# Patient Record
Sex: FEMALE | Race: BLACK OR AFRICAN AMERICAN | NOT HISPANIC OR LATINO | Employment: UNEMPLOYED | URBAN - METROPOLITAN AREA
[De-identification: names, ages, dates, MRNs, and addresses within clinical notes are randomized per-mention and may not be internally consistent; named-entity substitution may affect disease eponyms.]

---

## 2021-05-05 ENCOUNTER — HISTORICAL (OUTPATIENT)
Dept: ADMINISTRATIVE | Facility: HOSPITAL | Age: 26
End: 2021-05-05

## 2021-05-05 LAB
ABS NEUT (OLG): 3.31 X10(3)/MCL (ref 2.1–9.2)
ALBUMIN SERPL-MCNC: 4.3 GM/DL (ref 3.5–5)
ALBUMIN/GLOB SERPL: 1.2 RATIO (ref 1.1–2)
ALP SERPL-CCNC: 57 UNIT/L (ref 40–150)
ALT SERPL-CCNC: 9 UNIT/L (ref 0–55)
APPEARANCE, UA: ABNORMAL
AST SERPL-CCNC: 14 UNIT/L (ref 5–34)
B-HCG SERPL QL: NEGATIVE
BACTERIA #/AREA URNS AUTO: ABNORMAL /HPF
BASOPHILS # BLD AUTO: 0 X10(3)/MCL (ref 0–0.2)
BASOPHILS NFR BLD AUTO: 1 %
BILIRUB SERPL-MCNC: 0.8 MG/DL
BILIRUB UR QL STRIP: NEGATIVE
BILIRUBIN DIRECT+TOT PNL SERPL-MCNC: 0.3 MG/DL (ref 0–0.5)
BILIRUBIN DIRECT+TOT PNL SERPL-MCNC: 0.5 MG/DL (ref 0–0.8)
BUN SERPL-MCNC: 10.8 MG/DL (ref 7–18.7)
CALCIUM SERPL-MCNC: 9.8 MG/DL (ref 8.4–10.2)
CHLORIDE SERPL-SCNC: 106 MMOL/L (ref 98–107)
CO2 SERPL-SCNC: 26 MMOL/L (ref 22–29)
COLOR UR: YELLOW
CREAT SERPL-MCNC: 0.73 MG/DL (ref 0.55–1.02)
EOSINOPHIL # BLD AUTO: 0.1 X10(3)/MCL (ref 0–0.9)
EOSINOPHIL NFR BLD AUTO: 2 %
ERYTHROCYTE [DISTWIDTH] IN BLOOD BY AUTOMATED COUNT: 15.5 % (ref 11.5–14.5)
EST. AVERAGE GLUCOSE BLD GHB EST-MCNC: 91.1 MG/DL
FERRITIN SERPL-MCNC: 26.38 NG/ML (ref 4.63–204)
FSH SERPL-ACNC: 5.77 MIU/ML
GLOBULIN SER-MCNC: 3.6 GM/DL (ref 2.4–3.5)
GLUCOSE (UA): NEGATIVE
GLUCOSE SERPL-MCNC: 88 MG/DL (ref 74–100)
HBA1C MFR BLD: 4.8 %
HCT VFR BLD AUTO: 34.4 % (ref 35–46)
HGB BLD-MCNC: 11.2 GM/DL (ref 12–16)
HGB UR QL STRIP: NEGATIVE
HYALINE CASTS #/AREA URNS LPF: ABNORMAL /LPF
IMM GRANULOCYTES # BLD AUTO: 0.01 10*3/UL
IMM GRANULOCYTES NFR BLD AUTO: 0 %
IRON SATN MFR SERPL: 20 % (ref 20–50)
IRON SERPL-MCNC: 62 UG/DL (ref 50–170)
KETONES UR QL STRIP: NEGATIVE
LEUKOCYTE ESTERASE UR QL STRIP: NEGATIVE
LH SERPL-ACNC: 3.31 MIU/ML
LYMPHOCYTES # BLD AUTO: 2.3 X10(3)/MCL (ref 0.6–4.6)
LYMPHOCYTES NFR BLD AUTO: 36 %
MCH RBC QN AUTO: 25.2 PG (ref 26–34)
MCHC RBC AUTO-ENTMCNC: 32.6 GM/DL (ref 31–37)
MCV RBC AUTO: 77.3 FL (ref 80–100)
MONOCYTES # BLD AUTO: 0.6 X10(3)/MCL (ref 0.1–1.3)
MONOCYTES NFR BLD AUTO: 10 %
NEUTROPHILS # BLD AUTO: 3.31 X10(3)/MCL (ref 2.1–9.2)
NEUTROPHILS NFR BLD AUTO: 52 %
NITRITE UR QL STRIP: NEGATIVE
PH UR STRIP: 8 [PH] (ref 4.5–8)
PLATELET # BLD AUTO: 410 X10(3)/MCL (ref 130–400)
PMV BLD AUTO: 9.2 FL (ref 7.4–10.4)
POTASSIUM SERPL-SCNC: 4 MMOL/L (ref 3.5–5.1)
PROLACTIN LEVEL (OHS): 12.8 NG/ML (ref 5.18–26.53)
PROT SERPL-MCNC: 7.9 GM/DL (ref 6.4–8.3)
PROT UR QL STRIP: 10 MG/DL
RBC # BLD AUTO: 4.45 X10(6)/MCL (ref 4–5.2)
RBC #/AREA URNS AUTO: ABNORMAL /HPF
RET# (OHS): 0.06 X10(6)/MCL (ref 0.02–0.08)
RETICULOCYTE COUNT AUTOMATED (OLG): 1.4 % (ref 0.5–1.5)
SODIUM SERPL-SCNC: 139 MMOL/L (ref 136–145)
SP GR UR STRIP: 1.02 (ref 1–1.03)
SQUAMOUS #/AREA URNS LPF: ABNORMAL /LPF
T4 FREE SERPL-MCNC: 0.87 NG/DL (ref 0.7–1.48)
TIBC SERPL-MCNC: 242 UG/DL (ref 70–310)
TIBC SERPL-MCNC: 304 UG/DL (ref 250–450)
TRANSFERRIN SERPL-MCNC: 262 MG/DL (ref 180–382)
TSH SERPL-ACNC: 0.34 UIU/ML (ref 0.35–4.94)
UROBILINOGEN UR STRIP-ACNC: NORMAL
WBC # SPEC AUTO: 6.4 X10(3)/MCL (ref 4.5–11)
WBC #/AREA URNS AUTO: ABNORMAL /HPF

## 2022-02-05 ENCOUNTER — HISTORICAL (OUTPATIENT)
Dept: ADMINISTRATIVE | Facility: HOSPITAL | Age: 27
End: 2022-02-05

## 2022-04-10 ENCOUNTER — HISTORICAL (OUTPATIENT)
Dept: ADMINISTRATIVE | Facility: HOSPITAL | Age: 27
End: 2022-04-10
Payer: MEDICAID

## 2022-04-29 VITALS
WEIGHT: 146.19 LBS | HEIGHT: 63 IN | SYSTOLIC BLOOD PRESSURE: 124 MMHG | DIASTOLIC BLOOD PRESSURE: 85 MMHG | BODY MASS INDEX: 25.9 KG/M2 | OXYGEN SATURATION: 100 %

## 2022-08-19 ENCOUNTER — HOSPITAL ENCOUNTER (EMERGENCY)
Facility: HOSPITAL | Age: 27
Discharge: HOME | End: 2022-08-19
Attending: STUDENT IN AN ORGANIZED HEALTH CARE EDUCATION/TRAINING PROGRAM
Payer: MEDICAID

## 2022-08-19 VITALS
SYSTOLIC BLOOD PRESSURE: 145 MMHG | TEMPERATURE: 98 F | WEIGHT: 159.81 LBS | HEART RATE: 71 BPM | BODY MASS INDEX: 27.28 KG/M2 | OXYGEN SATURATION: 100 % | HEIGHT: 64 IN | RESPIRATION RATE: 18 BRPM | DIASTOLIC BLOOD PRESSURE: 94 MMHG

## 2022-08-19 DIAGNOSIS — R10.9 ABDOMINAL CRAMPING: Primary | ICD-10-CM

## 2022-08-19 DIAGNOSIS — R19.7 DIARRHEA, UNSPECIFIED TYPE: ICD-10-CM

## 2022-08-19 LAB
ALBUMIN SERPL-MCNC: 4.3 GM/DL (ref 3.5–5)
ALBUMIN/GLOB SERPL: 1.1 RATIO (ref 1.1–2)
ALP SERPL-CCNC: 66 UNIT/L (ref 40–150)
ALT SERPL-CCNC: 7 UNIT/L (ref 0–55)
APPEARANCE UR: CLEAR
AST SERPL-CCNC: 13 UNIT/L (ref 5–34)
B-HCG UR QL: NEGATIVE
BACTERIA #/AREA URNS AUTO: ABNORMAL /HPF
BASOPHILS # BLD AUTO: 0.03 X10(3)/MCL (ref 0–0.2)
BASOPHILS NFR BLD AUTO: 0.6 %
BILIRUB UR QL STRIP.AUTO: NEGATIVE MG/DL
BILIRUBIN DIRECT+TOT PNL SERPL-MCNC: 0.4 MG/DL
BUN SERPL-MCNC: 9.4 MG/DL (ref 7–18.7)
CALCIUM SERPL-MCNC: 9.7 MG/DL (ref 8.4–10.2)
CHLORIDE SERPL-SCNC: 102 MMOL/L (ref 98–107)
CO2 SERPL-SCNC: 24 MMOL/L (ref 22–29)
COLOR UR AUTO: YELLOW
CREAT SERPL-MCNC: 0.77 MG/DL (ref 0.55–1.02)
CTP QC/QA: YES
EOSINOPHIL # BLD AUTO: 0.04 X10(3)/MCL (ref 0–0.9)
EOSINOPHIL NFR BLD AUTO: 0.8 %
ERYTHROCYTE [DISTWIDTH] IN BLOOD BY AUTOMATED COUNT: 14.9 % (ref 11.5–17)
GFR SERPLBLD CREATININE-BSD FMLA CKD-EPI: >60 MLS/MIN/1.73/M2
GLOBULIN SER-MCNC: 4 GM/DL (ref 2.4–3.5)
GLUCOSE SERPL-MCNC: 94 MG/DL (ref 74–100)
GLUCOSE UR QL STRIP.AUTO: NORMAL MG/DL
HCT VFR BLD AUTO: 35.4 % (ref 37–47)
HGB BLD-MCNC: 11.5 GM/DL (ref 12–16)
HYALINE CASTS #/AREA URNS LPF: ABNORMAL /LPF
IMM GRANULOCYTES # BLD AUTO: 0.01 X10(3)/MCL (ref 0–0.04)
IMM GRANULOCYTES NFR BLD AUTO: 0.2 %
KETONES UR QL STRIP.AUTO: NEGATIVE MG/DL
LEUKOCYTE ESTERASE UR QL STRIP.AUTO: NEGATIVE UNIT/L
LIPASE SERPL-CCNC: 19 U/L
LYMPHOCYTES # BLD AUTO: 1.71 X10(3)/MCL (ref 0.6–4.6)
LYMPHOCYTES NFR BLD AUTO: 34.8 %
MCH RBC QN AUTO: 24.4 PG (ref 27–31)
MCHC RBC AUTO-ENTMCNC: 32.5 MG/DL (ref 33–36)
MCV RBC AUTO: 75 FL (ref 80–94)
MONOCYTES # BLD AUTO: 0.43 X10(3)/MCL (ref 0.1–1.3)
MONOCYTES NFR BLD AUTO: 8.7 %
MUCOUS THREADS URNS QL MICRO: ABNORMAL /LPF
NEUTROPHILS # BLD AUTO: 2.7 X10(3)/MCL (ref 2.1–9.2)
NEUTROPHILS NFR BLD AUTO: 54.9 %
NITRITE UR QL STRIP.AUTO: NEGATIVE
NRBC BLD AUTO-RTO: 0 %
PH UR STRIP.AUTO: 6 [PH]
PLATELET # BLD AUTO: 417 X10(3)/MCL (ref 130–400)
PMV BLD AUTO: 9.4 FL (ref 7.4–10.4)
POTASSIUM SERPL-SCNC: 3.8 MMOL/L (ref 3.5–5.1)
PROT SERPL-MCNC: 8.3 GM/DL (ref 6.4–8.3)
PROT UR QL STRIP.AUTO: ABNORMAL MG/DL
RBC # BLD AUTO: 4.72 X10(6)/MCL (ref 4.2–5.4)
RBC #/AREA URNS AUTO: ABNORMAL /HPF
RBC UR QL AUTO: NEGATIVE UNIT/L
SODIUM SERPL-SCNC: 137 MMOL/L (ref 136–145)
SP GR UR STRIP.AUTO: 1.03
SQUAMOUS #/AREA URNS LPF: ABNORMAL /HPF
UROBILINOGEN UR STRIP-ACNC: NORMAL MG/DL
WBC # SPEC AUTO: 4.9 X10(3)/MCL (ref 4.5–11.5)
WBC #/AREA URNS AUTO: ABNORMAL /HPF

## 2022-08-19 PROCEDURE — 81025 URINE PREGNANCY TEST: CPT | Performed by: PHYSICIAN ASSISTANT

## 2022-08-19 PROCEDURE — 83690 ASSAY OF LIPASE: CPT | Performed by: PHYSICIAN ASSISTANT

## 2022-08-19 PROCEDURE — 85025 COMPLETE CBC W/AUTO DIFF WBC: CPT | Performed by: PHYSICIAN ASSISTANT

## 2022-08-19 PROCEDURE — 63600175 PHARM REV CODE 636 W HCPCS: Performed by: PHYSICIAN ASSISTANT

## 2022-08-19 PROCEDURE — 96372 THER/PROPH/DIAG INJ SC/IM: CPT | Performed by: PHYSICIAN ASSISTANT

## 2022-08-19 PROCEDURE — 36415 COLL VENOUS BLD VENIPUNCTURE: CPT | Performed by: PHYSICIAN ASSISTANT

## 2022-08-19 PROCEDURE — 81001 URINALYSIS AUTO W/SCOPE: CPT | Performed by: PHYSICIAN ASSISTANT

## 2022-08-19 PROCEDURE — 80053 COMPREHEN METABOLIC PANEL: CPT | Performed by: PHYSICIAN ASSISTANT

## 2022-08-19 PROCEDURE — 99284 EMERGENCY DEPT VISIT MOD MDM: CPT | Mod: 25

## 2022-08-19 RX ORDER — ONDANSETRON 4 MG/1
4 TABLET, FILM COATED ORAL EVERY 8 HOURS PRN
Qty: 12 TABLET | Refills: 0 | Status: SHIPPED | OUTPATIENT
Start: 2022-08-19 | End: 2022-08-19 | Stop reason: SDUPTHER

## 2022-08-19 RX ORDER — DICYCLOMINE HYDROCHLORIDE 10 MG/1
20 CAPSULE ORAL 4 TIMES DAILY PRN
Qty: 40 CAPSULE | Refills: 0 | Status: SHIPPED | OUTPATIENT
Start: 2022-08-19 | End: 2022-08-19 | Stop reason: SDUPTHER

## 2022-08-19 RX ORDER — DICYCLOMINE HYDROCHLORIDE 10 MG/ML
20 INJECTION INTRAMUSCULAR
Status: COMPLETED | OUTPATIENT
Start: 2022-08-19 | End: 2022-08-19

## 2022-08-19 RX ORDER — ONDANSETRON 4 MG/1
4 TABLET, FILM COATED ORAL EVERY 8 HOURS PRN
Qty: 12 TABLET | Refills: 0 | Status: SHIPPED | OUTPATIENT
Start: 2022-08-19 | End: 2023-02-13

## 2022-08-19 RX ORDER — DICYCLOMINE HYDROCHLORIDE 10 MG/1
20 CAPSULE ORAL 4 TIMES DAILY PRN
Qty: 40 CAPSULE | Refills: 0 | Status: SHIPPED | OUTPATIENT
Start: 2022-08-19 | End: 2022-08-24

## 2022-08-19 RX ADMIN — DICYCLOMINE HYDROCHLORIDE 20 MG: 20 INJECTION, SOLUTION INTRAMUSCULAR at 08:08

## 2022-08-19 NOTE — ED PROVIDER NOTES
Encounter Date: 8/19/2022       History     Chief Complaint   Patient presents with    Abdominal Pain     C/o diarrhea for a few days. Now no diarrhea but has abdominal pain and bright red blood in stool     Patient with no pmhx presents today c/o diarrhea and abdominal cramping/pain for the last 4-5 days. She says diarrhea has actually resolved. Now she is having frequent formed bowel movements with associated abdominal cramping. She also reports noticing small amount of bright red blood in stool 2 times. She has been taking peptobismol at home. Denies fever, chills, vomiting, constipation, dysuria, hematuria, vaginal bleeding, recent travel, sick contacts, recent abx use.         Review of patient's allergies indicates:  No Known Allergies  History reviewed. No pertinent past medical history.  History reviewed. No pertinent surgical history.  No family history on file.  Social History     Tobacco Use    Smoking status: Never Smoker    Smokeless tobacco: Never Used   Substance Use Topics    Alcohol use: Yes     Comment: occ    Drug use: Never     Review of Systems   Constitutional: Negative for chills and fever.   HENT: Positive for congestion. Negative for postnasal drip, rhinorrhea, sinus pressure, sinus pain and sore throat.    Respiratory: Negative for chest tightness and shortness of breath.    Cardiovascular: Negative for chest pain, palpitations and leg swelling.   Gastrointestinal: Positive for abdominal pain, blood in stool and diarrhea. Negative for abdominal distention, anal bleeding, constipation, nausea, rectal pain and vomiting.   Genitourinary: Negative for dysuria, flank pain and hematuria.   Musculoskeletal: Negative for arthralgias and myalgias.   Skin: Negative for rash.   Neurological: Negative for syncope, light-headedness and headaches.   Hematological: Does not bruise/bleed easily.       Physical Exam     Initial Vitals [08/19/22 0750]   BP Pulse Resp Temp SpO2   126/81 81 20 97.9 °F  (36.6 °C) 100 %      MAP       --         Physical Exam    Vitals reviewed.  Constitutional: She appears well-developed and well-nourished. She is not diaphoretic. No distress.   HENT:   Head: Normocephalic and atraumatic.   Mouth/Throat: Oropharynx is clear and moist.   Eyes: Conjunctivae and EOM are normal.   Neck: Neck supple.   Normal range of motion.  Cardiovascular: Normal rate, normal heart sounds and intact distal pulses.   Pulmonary/Chest: Breath sounds normal. No respiratory distress.   Abdominal: Abdomen is soft and flat. Bowel sounds are normal. There is generalized abdominal tenderness.   No right CVA tenderness.  No left CVA tenderness. There is no rebound and no guarding.   Musculoskeletal:         General: No edema.      Cervical back: Normal range of motion and neck supple.     Neurological: She is alert and oriented to person, place, and time. She has normal strength. GCS score is 15. GCS eye subscore is 4. GCS verbal subscore is 5. GCS motor subscore is 6.   Skin: Skin is warm and dry. Capillary refill takes less than 2 seconds.   Psychiatric: She has a normal mood and affect.         ED Course   Procedures  Labs Reviewed   COMPREHENSIVE METABOLIC PANEL - Abnormal; Notable for the following components:       Result Value    Globulin 4.0 (*)     All other components within normal limits   URINALYSIS, REFLEX TO URINE CULTURE - Abnormal; Notable for the following components:    Protein, UA Trace (*)     Squamous Epithelial Cells, UA Moderate (*)     Mucous, UA Many (*)     All other components within normal limits   CBC WITH DIFFERENTIAL - Abnormal; Notable for the following components:    Hgb 11.5 (*)     Hct 35.4 (*)     MCV 75.0 (*)     MCH 24.4 (*)     MCHC 32.5 (*)     Platelet 417 (*)     All other components within normal limits   LIPASE - Normal   CBC W/ AUTO DIFFERENTIAL    Narrative:     The following orders were created for panel order CBC auto differential.  Procedure                                Abnormality         Status                     ---------                               -----------         ------                     CBC with Differential[[id number]]        Abnormal            Final result                 Please view results for these tests on the individual orders.   EXTRA TUBES    Narrative:     The following orders were created for panel order EXTRA TUBES.  Procedure                               Abnormality         Status                     ---------                               -----------         ------                     Light Blue Top Hold[[id number]]                              In process                 Red Top Hold[[id number]]                                     In process                 Pink Top Hold[[id number]]                                    In process                   Please view results for these tests on the individual orders.   LIGHT BLUE TOP HOLD   RED TOP HOLD   PINK TOP HOLD   POCT URINE PREGNANCY          Imaging Results          X-Ray Abdomen Flat And Erect (Final result)  Result time 08/19/22 08:51:32    Final result by Remigio Maria MD (08/19/22 08:51:32)                 Impression:      No acute radiographic findings.      Electronically signed by: Remigio Maria  Date:    08/19/2022  Time:    08:51             Narrative:    EXAMINATION:  XR ABDOMEN FLAT AND ERECT    CLINICAL HISTORY:  Unspecified abdominal pain    COMPARISON:  5 February 2022    FINDINGS:  Flat and upright views of the abdomen.  There is a nonspecific, nonobstructive bowel gas pattern.  There is no large stool burden.  No free air is seen.                                 Medications   dicyclomine injection 20 mg (20 mg Intramuscular Given 8/19/22 0821)           APC / Resident Notes:   I was not physically present during the history, exam or disposition of this patient. I was available at all times for consultation. (Zmora)        ED Course as of 08/20/22 1175   Fri Aug 19, 2022   8751  Patient re-evaluated at this time. Resting comfortably in exam room. Her pain has improved. Labs and imaging reviewed. All results discussed with patient. She is stable for discharge. Strict return to ED precautions given. [SA]      ED Course User Index  [SA] STEVE Tesafye             Clinical Impression:   Final diagnoses:  [R10.9] Abdominal cramping (Primary)  [R19.7] Diarrhea, unspecified type          ED Disposition Condition    Discharge Stable        ED Prescriptions     Medication Sig Dispense Start Date End Date Auth. Provider    dicyclomine (BENTYL) 10 MG capsule  (Status: Discontinued) Take 2 capsules (20 mg total) by mouth 4 (four) times daily as needed (abdominal pain or cramping). 40 capsule 8/19/2022 8/19/2022 STEVE Tesfaye    ondansetron (ZOFRAN) 4 MG tablet  (Status: Discontinued) Take 1 tablet (4 mg total) by mouth every 8 (eight) hours as needed for Nausea. 12 tablet 8/19/2022 8/19/2022 STEVE Tesfaye    dicyclomine (BENTYL) 10 MG capsule Take 2 capsules (20 mg total) by mouth 4 (four) times daily as needed (abdominal pain or cramping). 40 capsule 8/19/2022 8/24/2022 STEVE Tesfaye    ondansetron (ZOFRAN) 4 MG tablet Take 1 tablet (4 mg total) by mouth every 8 (eight) hours as needed for Nausea. 12 tablet 8/19/2022  STEVE Tesfaye        Follow-up Information     Follow up With Specialties Details Why Contact Info    [facility] - Emergency Dept Emergency Medicine  If symptoms worsen return to ED immediately [address] [facility] [address] [phone]    Primary Care Provider within 1-2 days  Go in 1 day             STEVE Tesfaye  08/19/22 1699       Ender Haywood MD  08/20/22 2925

## 2022-08-19 NOTE — DISCHARGE INSTRUCTIONS
It is important that you follow up with your primary care provider or specialist if indicated for further evaluation, workup, and treatment as necessary. The exam and treatment you received in Emergency Department was for an urgent problem and NOT INTENDED AS COMPLETE CARE. It is important that you FOLLOW UP with a doctor for ongoing care. If your symptoms become WORSE or you DO NOT IMPROVE and you are unable to reach your health care provider, you should RETURN to the Emergency Department. The Emergency Department provider has provided a PRELIMINARY INTERPRETATION of all your studies. A final interpretation may be done after you are discharged. If a change in your diagnosis or treatment is needed WE WILL CONTACT YOU. It is critical that we have a CURRENT PHONE NUMBER FOR YOU.

## 2023-02-13 ENCOUNTER — OFFICE VISIT (OUTPATIENT)
Dept: FAMILY MEDICINE | Facility: CLINIC | Age: 28
End: 2023-02-13
Payer: MEDICAID

## 2023-02-13 VITALS
HEART RATE: 75 BPM | DIASTOLIC BLOOD PRESSURE: 84 MMHG | BODY MASS INDEX: 28.68 KG/M2 | RESPIRATION RATE: 20 BRPM | SYSTOLIC BLOOD PRESSURE: 138 MMHG | HEIGHT: 64 IN | TEMPERATURE: 99 F | WEIGHT: 168 LBS | OXYGEN SATURATION: 99 %

## 2023-02-13 DIAGNOSIS — F43.10 POSTTRAUMATIC STRESS DISORDER: ICD-10-CM

## 2023-02-13 DIAGNOSIS — F31.81 BIPOLAR II DISORDER, MOST RECENT EPISODE MAJOR DEPRESSIVE: ICD-10-CM

## 2023-02-13 DIAGNOSIS — F32.2 MAJOR DEPRESSIVE DISORDER, SINGLE EPISODE, SEVERE WITHOUT PSYCHOTIC FEATURES: ICD-10-CM

## 2023-02-13 DIAGNOSIS — Z00.00 ENCOUNTER FOR WELLNESS EXAMINATION: Primary | ICD-10-CM

## 2023-02-13 LAB
ALBUMIN SERPL-MCNC: 4.6 G/DL (ref 3.5–5)
ALBUMIN/GLOB SERPL: 1.2 RATIO (ref 1.1–2)
ALP SERPL-CCNC: 68 UNIT/L (ref 40–150)
ALT SERPL-CCNC: 11 UNIT/L (ref 0–55)
APPEARANCE UR: CLEAR
AST SERPL-CCNC: 17 UNIT/L (ref 5–34)
B-HCG UR QL: NEGATIVE
BACTERIA #/AREA URNS AUTO: ABNORMAL /HPF
BASOPHILS # BLD AUTO: 0.06 X10(3)/MCL (ref 0–0.2)
BASOPHILS NFR BLD AUTO: 1.2 %
BILIRUB UR QL STRIP.AUTO: NEGATIVE MG/DL
BILIRUBIN DIRECT+TOT PNL SERPL-MCNC: 1 MG/DL
BUN SERPL-MCNC: 10 MG/DL (ref 7–18.7)
CALCIUM SERPL-MCNC: 10 MG/DL (ref 8.4–10.2)
CHLORIDE SERPL-SCNC: 104 MMOL/L (ref 98–107)
CHOLEST SERPL-MCNC: 199 MG/DL
CHOLEST/HDLC SERPL: 3 {RATIO} (ref 0–5)
CO2 SERPL-SCNC: 25 MMOL/L (ref 22–29)
COLOR UR AUTO: ABNORMAL
CREAT SERPL-MCNC: 0.91 MG/DL (ref 0.55–1.02)
CTP QC/QA: YES
EOSINOPHIL # BLD AUTO: 0.09 X10(3)/MCL (ref 0–0.9)
EOSINOPHIL NFR BLD AUTO: 1.8 %
ERYTHROCYTE [DISTWIDTH] IN BLOOD BY AUTOMATED COUNT: 15.6 % (ref 11.5–17)
EST. AVERAGE GLUCOSE BLD GHB EST-MCNC: 93.9 MG/DL
GFR SERPLBLD CREATININE-BSD FMLA CKD-EPI: >60 MLS/MIN/1.73/M2
GLOBULIN SER-MCNC: 4 GM/DL (ref 2.4–3.5)
GLUCOSE SERPL-MCNC: 90 MG/DL (ref 74–100)
GLUCOSE UR QL STRIP.AUTO: NORMAL MG/DL
HAV IGM SERPL QL IA: NONREACTIVE
HBA1C MFR BLD: 4.9 %
HBV CORE IGM SERPL QL IA: NONREACTIVE
HBV SURFACE AG SERPL QL IA: NONREACTIVE
HCT VFR BLD AUTO: 35.9 % (ref 37–47)
HCV AB SERPL QL IA: NONREACTIVE
HDLC SERPL-MCNC: 67 MG/DL (ref 35–60)
HGB BLD-MCNC: 12.1 GM/DL (ref 12–16)
HIV 1+2 AB+HIV1 P24 AG SERPL QL IA: NONREACTIVE
HYALINE CASTS #/AREA URNS LPF: ABNORMAL /LPF
IMM GRANULOCYTES # BLD AUTO: 0.01 X10(3)/MCL (ref 0–0.04)
IMM GRANULOCYTES NFR BLD AUTO: 0.2 %
KETONES UR QL STRIP.AUTO: NEGATIVE MG/DL
LDLC SERPL CALC-MCNC: 123 MG/DL (ref 50–140)
LEUKOCYTE ESTERASE UR QL STRIP.AUTO: NEGATIVE UNIT/L
LYMPHOCYTES # BLD AUTO: 1.92 X10(3)/MCL (ref 0.6–4.6)
LYMPHOCYTES NFR BLD AUTO: 39.2 %
MCH RBC QN AUTO: 24.6 PG
MCHC RBC AUTO-ENTMCNC: 33.7 MG/DL (ref 33–36)
MCV RBC AUTO: 73.1 FL (ref 80–94)
MONOCYTES # BLD AUTO: 0.38 X10(3)/MCL (ref 0.1–1.3)
MONOCYTES NFR BLD AUTO: 7.8 %
MUCOUS THREADS URNS QL MICRO: ABNORMAL /LPF
NEUTROPHILS # BLD AUTO: 2.44 X10(3)/MCL (ref 2.1–9.2)
NEUTROPHILS NFR BLD AUTO: 49.8 %
NITRITE UR QL STRIP.AUTO: NEGATIVE
NRBC BLD AUTO-RTO: 0 %
PH UR STRIP.AUTO: 7.5 [PH]
PLATELET # BLD AUTO: 494 X10(3)/MCL (ref 130–400)
PMV BLD AUTO: 9.1 FL (ref 7.4–10.4)
POTASSIUM SERPL-SCNC: 4.5 MMOL/L (ref 3.5–5.1)
PROT SERPL-MCNC: 8.6 GM/DL (ref 6.4–8.3)
PROT UR QL STRIP.AUTO: NEGATIVE MG/DL
RBC # BLD AUTO: 4.91 X10(6)/MCL (ref 4.2–5.4)
RBC #/AREA URNS AUTO: ABNORMAL /HPF
RBC UR QL AUTO: NEGATIVE UNIT/L
SODIUM SERPL-SCNC: 139 MMOL/L (ref 136–145)
SP GR UR STRIP.AUTO: 1.02
SQUAMOUS #/AREA URNS LPF: ABNORMAL /HPF
T PALLIDUM AB SER QL: NONREACTIVE
T4 FREE SERPL-MCNC: 0.98 NG/DL (ref 0.7–1.48)
TRIGL SERPL-MCNC: 45 MG/DL (ref 37–140)
TSH SERPL-ACNC: 0.23 UIU/ML (ref 0.35–4.94)
UROBILINOGEN UR STRIP-ACNC: NORMAL MG/DL
VLDLC SERPL CALC-MCNC: 9 MG/DL
WBC # SPEC AUTO: 4.9 X10(3)/MCL (ref 4.5–11.5)
WBC #/AREA URNS AUTO: ABNORMAL /HPF

## 2023-02-13 PROCEDURE — 85025 COMPLETE CBC W/AUTO DIFF WBC: CPT | Performed by: NURSE PRACTITIONER

## 2023-02-13 PROCEDURE — 3075F SYST BP GE 130 - 139MM HG: CPT | Mod: CPTII,,, | Performed by: NURSE PRACTITIONER

## 2023-02-13 PROCEDURE — 3008F BODY MASS INDEX DOCD: CPT | Mod: CPTII,,, | Performed by: NURSE PRACTITIONER

## 2023-02-13 PROCEDURE — 3075F PR MOST RECENT SYSTOLIC BLOOD PRESS GE 130-139MM HG: ICD-10-PCS | Mod: CPTII,,, | Performed by: NURSE PRACTITIONER

## 2023-02-13 PROCEDURE — 99214 OFFICE O/P EST MOD 30 MIN: CPT | Mod: PBBFAC,PN | Performed by: NURSE PRACTITIONER

## 2023-02-13 PROCEDURE — 1159F PR MEDICATION LIST DOCUMENTED IN MEDICAL RECORD: ICD-10-PCS | Mod: CPTII,,, | Performed by: NURSE PRACTITIONER

## 2023-02-13 PROCEDURE — 84439 ASSAY OF FREE THYROXINE: CPT | Performed by: NURSE PRACTITIONER

## 2023-02-13 PROCEDURE — 99214 PR OFFICE/OUTPT VISIT, EST, LEVL IV, 30-39 MIN: ICD-10-PCS | Mod: S$PBB,,, | Performed by: NURSE PRACTITIONER

## 2023-02-13 PROCEDURE — 83036 HEMOGLOBIN GLYCOSYLATED A1C: CPT | Performed by: NURSE PRACTITIONER

## 2023-02-13 PROCEDURE — 99214 OFFICE O/P EST MOD 30 MIN: CPT | Mod: S$PBB,,, | Performed by: NURSE PRACTITIONER

## 2023-02-13 PROCEDURE — 80074 ACUTE HEPATITIS PANEL: CPT | Performed by: NURSE PRACTITIONER

## 2023-02-13 PROCEDURE — 80053 COMPREHEN METABOLIC PANEL: CPT | Performed by: NURSE PRACTITIONER

## 2023-02-13 PROCEDURE — 3079F PR MOST RECENT DIASTOLIC BLOOD PRESSURE 80-89 MM HG: ICD-10-PCS | Mod: CPTII,,, | Performed by: NURSE PRACTITIONER

## 2023-02-13 PROCEDURE — 87389 HIV-1 AG W/HIV-1&-2 AB AG IA: CPT | Performed by: NURSE PRACTITIONER

## 2023-02-13 PROCEDURE — 36415 COLL VENOUS BLD VENIPUNCTURE: CPT | Performed by: NURSE PRACTITIONER

## 2023-02-13 PROCEDURE — 3079F DIAST BP 80-89 MM HG: CPT | Mod: CPTII,,, | Performed by: NURSE PRACTITIONER

## 2023-02-13 PROCEDURE — 81025 URINE PREGNANCY TEST: CPT | Mod: PBBFAC,PN | Performed by: NURSE PRACTITIONER

## 2023-02-13 PROCEDURE — 81001 URINALYSIS AUTO W/SCOPE: CPT | Performed by: NURSE PRACTITIONER

## 2023-02-13 PROCEDURE — 84443 ASSAY THYROID STIM HORMONE: CPT | Performed by: NURSE PRACTITIONER

## 2023-02-13 PROCEDURE — 1159F MED LIST DOCD IN RCRD: CPT | Mod: CPTII,,, | Performed by: NURSE PRACTITIONER

## 2023-02-13 PROCEDURE — 1160F RVW MEDS BY RX/DR IN RCRD: CPT | Mod: CPTII,,, | Performed by: NURSE PRACTITIONER

## 2023-02-13 PROCEDURE — 80061 LIPID PANEL: CPT | Performed by: NURSE PRACTITIONER

## 2023-02-13 PROCEDURE — 86780 TREPONEMA PALLIDUM: CPT | Performed by: NURSE PRACTITIONER

## 2023-02-13 PROCEDURE — 1160F PR REVIEW ALL MEDS BY PRESCRIBER/CLIN PHARMACIST DOCUMENTED: ICD-10-PCS | Mod: CPTII,,, | Performed by: NURSE PRACTITIONER

## 2023-02-13 PROCEDURE — 3008F PR BODY MASS INDEX (BMI) DOCUMENTED: ICD-10-PCS | Mod: CPTII,,, | Performed by: NURSE PRACTITIONER

## 2023-02-13 NOTE — ASSESSMENT & PLAN NOTE
Referral to Dr. Loyola, resource list handed to patient to call [facility] for quicker appointment if necessary.   ER for SI/HI

## 2023-02-13 NOTE — ASSESSMENT & PLAN NOTE
Referral to Dr. Loyola, resource list handed to patient to call [facility] for quicker appointment if necessary.   ER for SI/HI  Read positive daily meditations, avoid negative media, set healthy boundaries.  Exercise daily, keep consistent sleep pattern, eat a healthy diet.  Establish good social support, make changes to reduce stress.  Reports any symptoms of suicidal/homicidal ideations or self harm immediately, if clinic is closed go to nearest emergency room.

## 2023-02-13 NOTE — ASSESSMENT & PLAN NOTE
Referral to Dr. Loyola, resource list handed to patient to call [facility] for quicker appointment if necessary.   ER for SI/HI

## 2023-02-13 NOTE — PROGRESS NOTES
"Patient Name: Himanshu Mallory   : 1995  MRN: [id number]     SUBJECTIVE DATA:    CHIEF COMPLAINT:   Himanshu Mallory is a 27 y.o. female who presents to clinic today with Follow-up (Depression, fasting)        HPI:  23:  Reestablish care. Has not been seen since 2022. Having depressive symptoms, hx bipolar II disorder, previously seen by MARIA G Thomson.  Is not on any medication for her depression. Denies SI/HI.  She does not have counselor. Hx PTSD.  Is having abnormal uterine bleeding, previously established with women's health clinic, number given to call for appointment.  Denies any other complaints today.  Feels tired most days.          ALLERGIES: Review of patient's allergies indicates:  No Known Allergies      ROS:  Review of Systems   Constitutional:  Positive for malaise/fatigue. Negative for chills, diaphoresis, fever and weight loss.   Eyes: Negative.    Respiratory: Negative.     Cardiovascular: Negative.    Gastrointestinal: Negative.    Genitourinary: Negative.    Musculoskeletal: Negative.    Skin:  Negative for itching and rash.   Neurological: Negative.    Endo/Heme/Allergies: Negative.    Psychiatric/Behavioral:  Positive for depression. Negative for memory loss, substance abuse and suicidal ideas. The patient is not nervous/anxious and does not have insomnia.        OBJECTIVE DATA:  Vital signs  Vitals:    23 1039   BP: 138/84   BP Location: Right arm   Patient Position: Sitting   BP Method: Medium (Automatic)   Pulse: 75   Resp: 20   Temp: 98.6 °F (37 °C)   TempSrc: Oral   SpO2: 99%   Weight: 76.2 kg (168 lb)   Height: 5' 4" (1.626 m)      Body mass index is 28.84 kg/m².    PHYSICAL EXAM:   Physical Exam  Vitals and nursing note reviewed.   HENT:      Head: Normocephalic and atraumatic.   Cardiovascular:      Rate and Rhythm: Normal rate and regular rhythm.      Pulses: Normal pulses.      Heart sounds: Normal heart sounds.   Pulmonary:      Breath sounds: Normal " breath sounds.   Musculoskeletal:         General: Normal range of motion.      Cervical back: Normal range of motion.   Skin:     General: Skin is warm and dry.   Neurological:      General: No focal deficit present.      Mental Status: She is alert and oriented to person, place, and time.   Psychiatric:         Mood and Affect: Mood normal.        ASSESSMENT/PLAN:  1. Encounter for wellness examination  -     Lipid Panel  -     CBC Auto Differential  -     Comprehensive Metabolic Panel  -     Urinalysis  -     Hemoglobin A1C  -     TSH  -     T4, Free  -     Hepatitis Panel, Acute  -     HIV 1/2 Ag/Ab (4th Gen)  -     SYPHILIS ANTIBODY (WITH REFLEX RPR)  -     POCT Urine Pregnancy    2. Bipolar II disorder, most recent episode major depressive  Assessment & Plan:  Referral to Dr. Loyola, resource list handed to patient to call [facility] for quicker appointment if necessary.   ER for SI/HI  Read positive daily meditations, avoid negative media, set healthy boundaries.  Exercise daily, keep consistent sleep pattern, eat a healthy diet.  Establish good social support, make changes to reduce stress.  Reports any symptoms of suicidal/homicidal ideations or self harm immediately, if clinic is closed go to nearest emergency room.      Orders:  -     Ambulatory referral/consult to Behavioral Health; Future; Expected date: 02/13/2023    3. Posttraumatic stress disorder  Assessment & Plan:  Referral to Dr. Loyola, resource list handed to patient to call [facility] for quicker appointment if necessary.   ER for SI/HI      4. Major depressive disorder, single episode, severe without psychotic features  Assessment & Plan:  Referral to Dr. Loyola, resource list handed to patient to call [facility] for quicker appointment if necessary.   ER for SI/HI             RESULTS:  Recent Results (from the past 1008 hour(s))   POCT Urine Pregnancy    Collection Time: 02/13/23 11:27 AM   Result Value Ref Range    POC Preg Test, Ur Negative Negative    Quality  Control Acceptable Yes          Follow Up:  Follow up in about 2 weeks (around 2/27/2023) for Virtual Visit, Review of labs.               This note was created with the assistance of a voice recognition software or phone dictation. There may be transcription errors as a result of using this technology however minimal. Effort has been made to assure accuracy of transcription but any obvious errors or omissions should be clarified with the author of the document

## 2023-02-14 ENCOUNTER — TELEPHONE (OUTPATIENT)
Dept: GYNECOLOGY | Facility: CLINIC | Age: 28
End: 2023-02-14

## 2023-02-14 ENCOUNTER — CLINICAL SUPPORT (OUTPATIENT)
Dept: GYNECOLOGY | Facility: CLINIC | Age: 28
End: 2023-02-14
Payer: MEDICAID

## 2023-02-14 ENCOUNTER — TELEPHONE (OUTPATIENT)
Dept: FAMILY MEDICINE | Facility: CLINIC | Age: 28
End: 2023-02-14
Payer: MEDICAID

## 2023-02-14 ENCOUNTER — OFFICE VISIT (OUTPATIENT)
Dept: GYNECOLOGY | Facility: CLINIC | Age: 28
End: 2023-02-14
Payer: MEDICAID

## 2023-02-14 VITALS
TEMPERATURE: 98 F | HEART RATE: 82 BPM | OXYGEN SATURATION: 98 % | SYSTOLIC BLOOD PRESSURE: 122 MMHG | WEIGHT: 174.38 LBS | DIASTOLIC BLOOD PRESSURE: 87 MMHG | HEIGHT: 64 IN | RESPIRATION RATE: 18 BRPM | BODY MASS INDEX: 29.77 KG/M2

## 2023-02-14 DIAGNOSIS — Z01.419 WOMEN'S ANNUAL ROUTINE GYNECOLOGICAL EXAMINATION: Primary | ICD-10-CM

## 2023-02-14 DIAGNOSIS — E05.90 HYPERTHYROIDISM: Primary | ICD-10-CM

## 2023-02-14 DIAGNOSIS — Z23 NEED FOR HPV VACCINE: Primary | ICD-10-CM

## 2023-02-14 LAB
B-HCG UR QL: NEGATIVE
CLUE CELLS VAG QL WET PREP: ABNORMAL
CTP QC/QA: YES
T VAGINALIS VAG QL WET PREP: ABNORMAL
WBC #/AREA VAG WET PREP: ABNORMAL
YEAST SPEC QL WET PREP: ABNORMAL

## 2023-02-14 PROCEDURE — 99214 OFFICE O/P EST MOD 30 MIN: CPT | Mod: PBBFAC | Performed by: NURSE PRACTITIONER

## 2023-02-14 PROCEDURE — 3079F PR MOST RECENT DIASTOLIC BLOOD PRESSURE 80-89 MM HG: ICD-10-PCS | Mod: CPTII,,, | Performed by: NURSE PRACTITIONER

## 2023-02-14 PROCEDURE — 3079F DIAST BP 80-89 MM HG: CPT | Mod: CPTII,,, | Performed by: NURSE PRACTITIONER

## 2023-02-14 PROCEDURE — 99395 PREV VISIT EST AGE 18-39: CPT | Mod: S$PBB,,, | Performed by: NURSE PRACTITIONER

## 2023-02-14 PROCEDURE — 87210 SMEAR WET MOUNT SALINE/INK: CPT | Performed by: NURSE PRACTITIONER

## 2023-02-14 PROCEDURE — 3074F PR MOST RECENT SYSTOLIC BLOOD PRESSURE < 130 MM HG: ICD-10-PCS | Mod: CPTII,,, | Performed by: NURSE PRACTITIONER

## 2023-02-14 PROCEDURE — 99395 PR PREVENTIVE VISIT,EST,18-39: ICD-10-PCS | Mod: S$PBB,,, | Performed by: NURSE PRACTITIONER

## 2023-02-14 PROCEDURE — 81025 URINE PREGNANCY TEST: CPT | Mod: PBBFAC | Performed by: NURSE PRACTITIONER

## 2023-02-14 PROCEDURE — 88174 CYTOPATH C/V AUTO IN FLUID: CPT

## 2023-02-14 PROCEDURE — 1159F MED LIST DOCD IN RCRD: CPT | Mod: CPTII,,, | Performed by: NURSE PRACTITIONER

## 2023-02-14 PROCEDURE — 1159F PR MEDICATION LIST DOCUMENTED IN MEDICAL RECORD: ICD-10-PCS | Mod: CPTII,,, | Performed by: NURSE PRACTITIONER

## 2023-02-14 PROCEDURE — 87491 CHLMYD TRACH DNA AMP PROBE: CPT

## 2023-02-14 PROCEDURE — 1160F RVW MEDS BY RX/DR IN RCRD: CPT | Mod: CPTII,,, | Performed by: NURSE PRACTITIONER

## 2023-02-14 PROCEDURE — 90471 IMMUNIZATION ADMIN: CPT | Mod: PBBFAC

## 2023-02-14 PROCEDURE — 3008F PR BODY MASS INDEX (BMI) DOCUMENTED: ICD-10-PCS | Mod: CPTII,,, | Performed by: NURSE PRACTITIONER

## 2023-02-14 PROCEDURE — 3074F SYST BP LT 130 MM HG: CPT | Mod: CPTII,,, | Performed by: NURSE PRACTITIONER

## 2023-02-14 PROCEDURE — 87591 N.GONORRHOEAE DNA AMP PROB: CPT

## 2023-02-14 PROCEDURE — 1160F PR REVIEW ALL MEDS BY PRESCRIBER/CLIN PHARMACIST DOCUMENTED: ICD-10-PCS | Mod: CPTII,,, | Performed by: NURSE PRACTITIONER

## 2023-02-14 PROCEDURE — 3008F BODY MASS INDEX DOCD: CPT | Mod: CPTII,,, | Performed by: NURSE PRACTITIONER

## 2023-02-14 PROCEDURE — 90651 9VHPV VACCINE 2/3 DOSE IM: CPT | Mod: PBBFAC

## 2023-02-14 RX ORDER — METRONIDAZOLE 500 MG/1
500 TABLET ORAL EVERY 12 HOURS
Qty: 14 TABLET | Refills: 0 | Status: SHIPPED | OUTPATIENT
Start: 2023-02-14 | End: 2023-02-21

## 2023-02-14 RX ADMIN — HUMAN PAPILLOMAVIRUS 9-VALENT VACCINE, RECOMBINANT 0.5 ML: 30; 40; 60; 40; 20; 20; 20; 20; 20 INJECTION, SUSPENSION INTRAMUSCULAR at 02:02

## 2023-02-14 NOTE — TELEPHONE ENCOUNTER
----- Message from DEVIN Jose sent at 2/14/2023  8:14 AM CST -----  Thyroid level is low.  It was also low 1 year ago. This indicates hyperthyroidism. It was lower this time than 1 year ago.  I would like to repeat in 1-2 weeks and if still low, we will have to start treatment for hyperthyroidism.  This can affect mental state as well.      Hyperthyroidism occurs when the thyroid gland makes too much thyroid hormone. This speeds up your metabolism-how your body uses energy. This condition can cause you to be very active, lose weight, and have sleep problems, eye problems, and a fast heart rate. It can also cause a goiter.    If you have hyperthyroidism, lifestyle changes may help reduce your symptoms. Avoiding caffeine and reducing stress may help relieve symptoms of anxiety, nervousness, poor concentration, and fast heartbeat. Quitting smoking can reduce your risk of developing Grave's Ophthalmopathy if you have Grave's Disease    You should not be treated with Lithium for any conditions related to mental health disorders due to its effects on the thyroid gland.     If chest pain occurs, please go to ER immediately.      Please contact me if you have any additional concerns.    Sincerely,    Miriam Lazo

## 2023-02-14 NOTE — PROGRESS NOTES
I have sent medications and/or lab orders in for this patient.  Please notify the patient.     Orders Placed This Encounter   Procedures    TSH     Standing Status:   Future     Standing Expiration Date:   3/7/2023
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
## 2023-02-15 LAB — PATH REV: NORMAL

## 2023-02-20 LAB — PSYCHE PATHOLOGY RESULT: NORMAL

## 2023-03-06 ENCOUNTER — OFFICE VISIT (OUTPATIENT)
Dept: FAMILY MEDICINE | Facility: CLINIC | Age: 28
End: 2023-03-06
Payer: MEDICAID

## 2023-03-06 DIAGNOSIS — R79.89 LOW TSH LEVEL: ICD-10-CM

## 2023-03-06 PROCEDURE — 1159F MED LIST DOCD IN RCRD: CPT | Mod: CPTII,95,, | Performed by: NURSE PRACTITIONER

## 2023-03-06 PROCEDURE — 1159F PR MEDICATION LIST DOCUMENTED IN MEDICAL RECORD: ICD-10-PCS | Mod: CPTII,95,, | Performed by: NURSE PRACTITIONER

## 2023-03-06 PROCEDURE — 1160F RVW MEDS BY RX/DR IN RCRD: CPT | Mod: CPTII,95,, | Performed by: NURSE PRACTITIONER

## 2023-03-06 PROCEDURE — 1160F PR REVIEW ALL MEDS BY PRESCRIBER/CLIN PHARMACIST DOCUMENTED: ICD-10-PCS | Mod: CPTII,95,, | Performed by: NURSE PRACTITIONER

## 2023-03-06 PROCEDURE — 99213 PR OFFICE/OUTPT VISIT, EST, LEVL III, 20-29 MIN: ICD-10-PCS | Mod: 95,,, | Performed by: NURSE PRACTITIONER

## 2023-03-06 PROCEDURE — 99213 OFFICE O/P EST LOW 20 MIN: CPT | Mod: 95,,, | Performed by: NURSE PRACTITIONER

## 2023-03-06 NOTE — PROGRESS NOTES
Patient Name: Himanshu Mallory   : 1995  MRN: [id number]     SUBJECTIVE DATA:    CHIEF COMPLAINT:   Himanshu Mallory is a 27 y.o. female who presents to clinic today with Follow-up (2 week f/u for review of labs)        HPI:  3/6/23:  2 week FU review of labs.  TSH abnormal.  Did not get repeat done yet.  Will come to clinic to have drawn.  Tried to get in touch with [facility] but unable to get in.  She was referred to Nir at her last appointment.      23:  Reestablish care. Has not been seen since 2022. Having depressive symptoms, hx bipolar II disorder, previously seen by MARIA G Thomson.  Is not on any medication for her depression. Denies SI/HI.  She does not have counselor. Hx PTSD.  Is having abnormal uterine bleeding, previously established with women's health clinic, number given to call for appointment.  Denies any other complaints today.  Feels tired most days        ALLERGIES: Review of patient's allergies indicates:  No Known Allergies      ROS:  Review of Systems   Constitutional:  Positive for malaise/fatigue.   Psychiatric/Behavioral:  Positive for depression. The patient is nervous/anxious.        OBJECTIVE DATA:  Vital signs  There were no vitals filed for this visit.   There is no height or weight on file to calculate BMI.    PHYSICAL EXAM:   Physical Exam  Vitals and nursing note reviewed.   Neurological:      Mental Status: She is alert.   Psychiatric:         Attention and Perception: Attention normal.         Mood and Affect: Mood normal.         Speech: Speech normal.         Behavior: Behavior normal. Behavior is cooperative.         Thought Content: Thought content normal.         Cognition and Memory: Cognition normal.         Judgment: Judgment normal.        ASSESSMENT/PLAN:  1. Low TSH level  Assessment & Plan:  Come to clinic to repeat TSH level as ordered.    Will notify her if tx needed.              RESULTS:  Recent Results (from the past 1008 hour(s))   Lipid Panel     Collection Time: 02/13/23 11:27 AM   Result Value Ref Range    Cholesterol Total 199 <=200 mg/dL    HDL Cholesterol 67 (H) 35 - 60 mg/dL    Triglyceride 45 37 - 140 mg/dL    Cholesterol/HDL Ratio 3 0 - 5    Very Low Density Lipoprotein 9     LDL Cholesterol 123.00 50.00 - 140.00 mg/dL   Comprehensive Metabolic Panel    Collection Time: 02/13/23 11:27 AM   Result Value Ref Range    Sodium Level 139 136 - 145 mmol/L    Potassium Level 4.5 3.5 - 5.1 mmol/L    Chloride 104 98 - 107 mmol/L    Carbon Dioxide 25 22 - 29 mmol/L    Glucose Level 90 74 - 100 mg/dL    Blood Urea Nitrogen 10.0 7.0 - 18.7 mg/dL    Creatinine 0.91 0.55 - 1.02 mg/dL    Calcium Level Total 10.0 8.4 - 10.2 mg/dL    Protein Total 8.6 (H) 6.4 - 8.3 gm/dL    Albumin Level 4.6 3.5 - 5.0 g/dL    Globulin 4.0 (H) 2.4 - 3.5 gm/dL    Albumin/Globulin Ratio 1.2 1.1 - 2.0 ratio    Bilirubin Total 1.0 <=1.5 mg/dL    Alkaline Phosphatase 68 40 - 150 unit/L    Alanine Aminotransferase 11 0 - 55 unit/L    Aspartate Aminotransferase 17 5 - 34 unit/L    eGFR >60 mls/min/1.73/m2   Urinalysis    Collection Time: 02/13/23 11:27 AM   Result Value Ref Range    Color, UA Light-Yellow Yellow, Light-Yellow, Dark Yellow, Miryam, Straw    Appearance, UA Clear Clear    Specific Gravity, UA 1.021     pH, UA 7.5 5.0 - 8.5    Protein, UA Negative Negative mg/dL    Glucose, UA Normal Negative, Normal mg/dL    Ketones, UA Negative Negative mg/dL    Blood, UA Negative Negative unit/L    Bilirubin, UA Negative Negative mg/dL    Urobilinogen, UA Normal 0.2, 1.0, Normal mg/dL    Nitrites, UA Negative Negative    Leukocyte Esterase, UA Negative Negative unit/L    WBC, UA 0-5 None Seen, 0-2, 3-5, 0-5 /HPF    Bacteria, UA None Seen None Seen /HPF    Squamous Epithelial Cells, UA Trace (A) None Seen /HPF    Mucous, UA Trace (A) None Seen /LPF    Hyaline Casts, UA None Seen None Seen /lpf    RBC, UA 0-5 None Seen, 0-2, 3-5, 0-5 /HPF   Hemoglobin A1C    Collection Time: 02/13/23 11:27 AM    Result Value Ref Range    Hemoglobin A1c 4.9 <=7.0 %    Estimated Average Glucose 93.9 mg/dL   TSH    Collection Time: 02/13/23 11:27 AM   Result Value Ref Range    Thyroid Stimulating Hormone 0.227 (L) 0.350 - 4.940 uIU/mL   T4, Free    Collection Time: 02/13/23 11:27 AM   Result Value Ref Range    Thyroxine Free 0.98 0.70 - 1.48 ng/dL   Hepatitis Panel, Acute    Collection Time: 02/13/23 11:27 AM   Result Value Ref Range    Hepatitis A IgM Nonreactive Nonreactive    Hepatitis B Core IgM Nonreactive Nonreactive    Hepatitis B Surface Antigen Nonreactive Nonreactive    Hepatitis C Antibody Nonreactive Nonreactive   HIV 1/2 Ag/Ab (4th Gen)    Collection Time: 02/13/23 11:27 AM   Result Value Ref Range    HIV Nonreactive Nonreactive   SYPHILIS ANTIBODY (WITH REFLEX RPR)    Collection Time: 02/13/23 11:27 AM   Result Value Ref Range    Syphilis Antibody Nonreactive Nonreactive, Equivocal   CBC with Differential    Collection Time: 02/13/23 11:27 AM   Result Value Ref Range    WBC 4.9 4.5 - 11.5 x10(3)/mcL    RBC 4.91 4.20 - 5.40 x10(6)/mcL    Hgb 12.1 12.0 - 16.0 gm/dL    Hct 35.9 (L) 37.0 - 47.0 %    MCV 73.1 (L) 80.0 - 94.0 fL    MCH 24.6 pg    MCHC 33.7 33.0 - 36.0 mg/dL    RDW 15.6 11.5 - 17.0 %    Platelet 494 (H) 130 - 400 x10(3)/mcL    MPV 9.1 7.4 - 10.4 fL    Neut % 49.8 %    Lymph % 39.2 %    Mono % 7.8 %    Eos % 1.8 %    Basophil % 1.2 %    Lymph # 1.92 0.6 - 4.6 x10(3)/mcL    Neut # 2.44 2.1 - 9.2 x10(3)/mcL    Mono # 0.38 0.1 - 1.3 x10(3)/mcL    Eos # 0.09 0 - 0.9 x10(3)/mcL    Baso # 0.06 0 - 0.2 x10(3)/mcL    IG# 0.01 0 - 0.04 x10(3)/mcL    IG% 0.2 %    NRBC% 0.0 %   Pathologist Interpretation    Collection Time: 02/13/23 11:27 AM   Result Value Ref Range    Pathology Review       No serological evidence of recent or past hepatitis A, B, or C infection.    Vaughn Prater M.D.    POCT Urine Pregnancy    Collection Time: 02/13/23 11:27 AM   Result Value Ref Range    POC Preg Test, Ur Negative  Negative     Acceptable Yes    Wet Prep, Genital    Collection Time: 02/14/23  2:22 PM    Specimen: Cervicovaginal; Genital   Result Value Ref Range    WBC, Wet Prep Rare (A) None Seen    Clue Cells, Wet Prep Rare (A) None Seen    Trichomonas, Wet Prep None Seen None Seen    Yeast, Wet Prep None Seen None Seen   Liquid-Based Pap Smear, Screening Screening    Collection Time: 02/14/23  2:22 PM   Result Value Ref Range    Pathology Result     POCT urine pregnancy    Collection Time: 02/14/23  2:26 PM   Result Value Ref Range    POC Preg Test, Ur Negative Negative     Acceptable Yes          Follow Up:  Follow up in about 3 months (around 6/6/2023).               This note was created with the assistance of a voice recognition software or phone dictation. There may be transcription errors as a result of using this technology however minimal. Effort has been made to assure accuracy of transcription but any obvious errors or omissions should be clarified with the author of the document

## 2023-03-13 ENCOUNTER — CLINICAL SUPPORT (OUTPATIENT)
Dept: FAMILY MEDICINE | Facility: CLINIC | Age: 28
End: 2023-03-13
Payer: MEDICAID

## 2023-03-13 DIAGNOSIS — E05.90 HYPERTHYROIDISM: ICD-10-CM

## 2023-03-13 LAB — TSH SERPL-ACNC: 0.41 UIU/ML (ref 0.35–4.94)

## 2023-03-13 PROCEDURE — 84443 ASSAY THYROID STIM HORMONE: CPT

## 2023-03-13 PROCEDURE — 36415 COLL VENOUS BLD VENIPUNCTURE: CPT

## 2023-04-18 ENCOUNTER — TELEPHONE (OUTPATIENT)
Dept: FAMILY MEDICINE | Facility: CLINIC | Age: 28
End: 2023-04-18
Payer: MEDICAID

## 2023-04-27 ENCOUNTER — OFFICE VISIT (OUTPATIENT)
Dept: FAMILY MEDICINE | Facility: CLINIC | Age: 28
End: 2023-04-27
Payer: MEDICAID

## 2023-04-27 DIAGNOSIS — J30.2 SEASONAL ALLERGIES: ICD-10-CM

## 2023-04-27 PROCEDURE — 99212 OFFICE O/P EST SF 10 MIN: CPT | Mod: 95,,, | Performed by: NURSE PRACTITIONER

## 2023-04-27 PROCEDURE — 1160F RVW MEDS BY RX/DR IN RCRD: CPT | Mod: CPTII,95,, | Performed by: NURSE PRACTITIONER

## 2023-04-27 PROCEDURE — 99212 PR OFFICE/OUTPT VISIT, EST, LEVL II, 10-19 MIN: ICD-10-PCS | Mod: 95,,, | Performed by: NURSE PRACTITIONER

## 2023-04-27 PROCEDURE — 1159F MED LIST DOCD IN RCRD: CPT | Mod: CPTII,95,, | Performed by: NURSE PRACTITIONER

## 2023-04-27 PROCEDURE — 1160F PR REVIEW ALL MEDS BY PRESCRIBER/CLIN PHARMACIST DOCUMENTED: ICD-10-PCS | Mod: CPTII,95,, | Performed by: NURSE PRACTITIONER

## 2023-04-27 PROCEDURE — 1159F PR MEDICATION LIST DOCUMENTED IN MEDICAL RECORD: ICD-10-PCS | Mod: CPTII,95,, | Performed by: NURSE PRACTITIONER

## 2023-04-27 RX ORDER — CETIRIZINE HYDROCHLORIDE 10 MG/1
10 TABLET ORAL DAILY
COMMUNITY

## 2023-04-27 RX ORDER — DIPHENHYDRAMINE HCL 50 MG
50 CAPSULE ORAL EVERY 6 HOURS PRN
COMMUNITY

## 2023-04-27 RX ORDER — FLUTICASONE PROPIONATE 50 MCG
1 SPRAY, SUSPENSION (ML) NASAL DAILY
Qty: 18.2 ML | Refills: 3 | Status: SHIPPED | OUTPATIENT
Start: 2023-04-27

## 2023-04-27 NOTE — ASSESSMENT & PLAN NOTE
Flonase and Zyrtec, Claritin or Xyzal.    Refer to Asthma and Immunology for care  Avoid/limit triggers such as pollen, dust, molds and animal dander.  Avoid smoke, strong chemicals, and strong cleaning products in addition to strong perfumes/colognes.  Use rescue inhaler when needed, use nebulizer for wheezing or URI.  Report Upper Respiratory Infection in early stages and seek treatment.

## 2023-04-27 NOTE — PROGRESS NOTES
Patient Name: Himanshu Mallory   : 1995  MRN: [id number]     SUBJECTIVE DATA:    CHIEF COMPLAINT:   Himanshu Mallory is a 27 y.o. female who presents to clinic today with Allergies (Burning and  itching ears and eye, red eyes, sneezing)        HPI:  23:  c/o allergies: burning and itching ears and eye, red eyes, sneezing.  Is only on Zyrtec.  She was on allergy shots as a child.  This was a telemedicine call by phone because she had issues with logging in to video so I did not get to assess her eyes.  She states she has allergic shiners and itchy, watery eyes.  Has been on Zyrtec for 2 weeks only.  Would like to be referred to allergy and immunology specialist.    Repeat TSH was WNL    Audio Only Telehealth Visit  The patient location is: [place]  Visit type: Virtual visit with audio only (telephone)    Total time spent with patient: 15 min including time spent talking to the patient about her symptoms, medications, reviewing old record, preparing chart for call, med reconciliation, and follow up care.   The reason for the audio only service rather than synchronous audio and video virtual visit was related to technical difficulties or patient preference/necessity.  Each patient to whom I provide medical services by telemedicine is:  (1) informed of the relationship between the physician and patient and the respective role of any other health care provider with respect to management of the patient; and (2) notified that they may decline to receive medical services by telemedicine and may withdraw from such care at any time. Patient verbally consented to receive this service via voice-only telephone call.   This service was not originating from a related E/M service provided within the previous 7 days nor will  to an E/M service or procedure within the next 24 hours or my soonest available appointment.  Prevailing standard of care was able to be met in this audio-only visit.             3/6/23:  2 week FU  review of labs.  TSH abnormal.  Did not get repeat done yet.  Will come to clinic to have drawn.  Tried to get in touch with [facility] but unable to get in.  She was referred to Nir at her last appointment.      2/13/23:  Reestablish care. Has not been seen since January 2022. Having depressive symptoms, hx bipolar II disorder, previously seen by MARIA G Thomson.  Is not on any medication for her depression. Denies SI/HI.  She does not have counselor. Hx PTSD.  Is having abnormal uterine bleeding, previously established with women's health clinic, number given to call for appointment.  Denies any other complaints today.  Feels tired most days              ALLERGIES: Review of patient's allergies indicates:  No Known Allergies      ROS:  Review of Systems   HENT:          Ears itching, sneezing, itchy eyes       OBJECTIVE DATA:  Vital signs  There were no vitals filed for this visit.   There is no height or weight on file to calculate BMI.    PHYSICAL EXAM:   Physical Exam  Vitals and nursing note reviewed.   Neurological:      Mental Status: She is alert.   Psychiatric:         Attention and Perception: Attention normal.         Mood and Affect: Mood normal.         Speech: Speech normal.         Behavior: Behavior normal. Behavior is cooperative.         Thought Content: Thought content normal.         Cognition and Memory: Cognition normal.         Judgment: Judgment normal.        ASSESSMENT/PLAN:  1. Seasonal allergies  Assessment & Plan:  Flonase and Zyrtec, Claritin or Xyzal.    Refer to Asthma and Immunology for care  Avoid/limit triggers such as pollen, dust, molds and animal dander.  Avoid smoke, strong chemicals, and strong cleaning products in addition to strong perfumes/colognes.  Use rescue inhaler when needed, use nebulizer for wheezing or URI.  Report Upper Respiratory Infection in early stages and seek treatment.        Orders:  -     Ambulatory referral/consult to Allergy; Future; Expected date:  05/04/2023    Other orders  -     fluticasone propionate (FLONASE) 50 mcg/actuation nasal spray; 1 spray (50 mcg total) by Each Nostril route once daily.  Dispense: 18.2 mL; Refill: 3           RESULTS:  No results found for this or any previous visit (from the past 1008 hour(s)).      Follow Up:  Follow up in about 6 weeks (around 6/6/2023) for Allergies, Depression, Abnormal TSH.             This note was created with the assistance of a voice recognition software or phone dictation. There may be transcription errors as a result of using this technology however minimal. Effort has been made to assure accuracy of transcription but any obvious errors or omissions should be clarified with the author of the document

## 2023-08-17 ENCOUNTER — CLINICAL SUPPORT (OUTPATIENT)
Dept: GYNECOLOGY | Facility: CLINIC | Age: 28
End: 2023-08-17
Payer: MEDICAID

## 2023-08-17 DIAGNOSIS — Z23 NEED FOR HPV VACCINE: Primary | ICD-10-CM

## 2023-08-17 PROCEDURE — 90651 9VHPV VACCINE 2/3 DOSE IM: CPT | Mod: PBBFAC

## 2023-08-17 PROCEDURE — 99211 OFF/OP EST MAY X REQ PHY/QHP: CPT | Mod: PBBFAC

## 2023-08-17 PROCEDURE — 90471 IMMUNIZATION ADMIN: CPT | Mod: PBBFAC

## 2023-08-17 RX ADMIN — HUMAN PAPILLOMAVIRUS 9-VALENT VACCINE, RECOMBINANT 0.5 ML: 30; 40; 60; 40; 20; 20; 20; 20; 20 INJECTION, SUSPENSION INTRAMUSCULAR at 10:08

## 2023-11-14 ENCOUNTER — TELEPHONE (OUTPATIENT)
Dept: GYNECOLOGY | Facility: CLINIC | Age: 28
End: 2023-11-14
Payer: MEDICAID

## 2023-11-14 NOTE — TELEPHONE ENCOUNTER
Called pt about her concern and she is scheduled for 11/16/23 at 2:40pm. Pt accepted appointment date and time.

## 2023-11-16 ENCOUNTER — OFFICE VISIT (OUTPATIENT)
Dept: GYNECOLOGY | Facility: CLINIC | Age: 28
End: 2023-11-16
Payer: MEDICAID

## 2023-11-16 ENCOUNTER — TELEPHONE (OUTPATIENT)
Dept: GYNECOLOGY | Facility: CLINIC | Age: 28
End: 2023-11-16

## 2023-11-16 VITALS
OXYGEN SATURATION: 98 % | DIASTOLIC BLOOD PRESSURE: 80 MMHG | TEMPERATURE: 99 F | RESPIRATION RATE: 18 BRPM | SYSTOLIC BLOOD PRESSURE: 119 MMHG | HEART RATE: 82 BPM | HEIGHT: 64 IN | BODY MASS INDEX: 28.03 KG/M2 | WEIGHT: 164.19 LBS

## 2023-11-16 DIAGNOSIS — N90.89 VULVAR IRRITATION: Primary | ICD-10-CM

## 2023-11-16 DIAGNOSIS — Z11.3 ROUTINE SCREENING FOR STI (SEXUALLY TRANSMITTED INFECTION): ICD-10-CM

## 2023-11-16 DIAGNOSIS — N90.89 VULVAR LESION: ICD-10-CM

## 2023-11-16 LAB
C TRACH DNA SPEC QL NAA+PROBE: NOT DETECTED
CLUE CELLS VAG QL WET PREP: ABNORMAL
N GONORRHOEA DNA SPEC QL NAA+PROBE: NOT DETECTED
SOURCE (OHS): NORMAL
T VAGINALIS VAG QL WET PREP: ABNORMAL
WBC #/AREA VAG WET PREP: ABNORMAL
YEAST SPEC QL WET PREP: ABNORMAL

## 2023-11-16 PROCEDURE — 1159F PR MEDICATION LIST DOCUMENTED IN MEDICAL RECORD: ICD-10-PCS | Mod: CPTII,,, | Performed by: NURSE PRACTITIONER

## 2023-11-16 PROCEDURE — 87491 CHLMYD TRACH DNA AMP PROBE: CPT | Performed by: NURSE PRACTITIONER

## 2023-11-16 PROCEDURE — 3044F PR MOST RECENT HEMOGLOBIN A1C LEVEL <7.0%: ICD-10-PCS | Mod: CPTII,,, | Performed by: NURSE PRACTITIONER

## 2023-11-16 PROCEDURE — 3074F PR MOST RECENT SYSTOLIC BLOOD PRESSURE < 130 MM HG: ICD-10-PCS | Mod: CPTII,,, | Performed by: NURSE PRACTITIONER

## 2023-11-16 PROCEDURE — 3044F HG A1C LEVEL LT 7.0%: CPT | Mod: CPTII,,, | Performed by: NURSE PRACTITIONER

## 2023-11-16 PROCEDURE — 3079F DIAST BP 80-89 MM HG: CPT | Mod: CPTII,,, | Performed by: NURSE PRACTITIONER

## 2023-11-16 PROCEDURE — 1159F MED LIST DOCD IN RCRD: CPT | Mod: CPTII,,, | Performed by: NURSE PRACTITIONER

## 2023-11-16 PROCEDURE — 3074F SYST BP LT 130 MM HG: CPT | Mod: CPTII,,, | Performed by: NURSE PRACTITIONER

## 2023-11-16 PROCEDURE — 3008F PR BODY MASS INDEX (BMI) DOCUMENTED: ICD-10-PCS | Mod: CPTII,,, | Performed by: NURSE PRACTITIONER

## 2023-11-16 PROCEDURE — 99214 OFFICE O/P EST MOD 30 MIN: CPT | Mod: S$PBB,,, | Performed by: NURSE PRACTITIONER

## 2023-11-16 PROCEDURE — 87210 SMEAR WET MOUNT SALINE/INK: CPT | Performed by: NURSE PRACTITIONER

## 2023-11-16 PROCEDURE — 3079F PR MOST RECENT DIASTOLIC BLOOD PRESSURE 80-89 MM HG: ICD-10-PCS | Mod: CPTII,,, | Performed by: NURSE PRACTITIONER

## 2023-11-16 PROCEDURE — 1160F RVW MEDS BY RX/DR IN RCRD: CPT | Mod: CPTII,,, | Performed by: NURSE PRACTITIONER

## 2023-11-16 PROCEDURE — 87529 HSV DNA AMP PROBE: CPT | Performed by: NURSE PRACTITIONER

## 2023-11-16 PROCEDURE — 3008F BODY MASS INDEX DOCD: CPT | Mod: CPTII,,, | Performed by: NURSE PRACTITIONER

## 2023-11-16 PROCEDURE — 1160F PR REVIEW ALL MEDS BY PRESCRIBER/CLIN PHARMACIST DOCUMENTED: ICD-10-PCS | Mod: CPTII,,, | Performed by: NURSE PRACTITIONER

## 2023-11-16 PROCEDURE — 99214 OFFICE O/P EST MOD 30 MIN: CPT | Mod: PBBFAC | Performed by: NURSE PRACTITIONER

## 2023-11-16 PROCEDURE — 99214 PR OFFICE/OUTPT VISIT, EST, LEVL IV, 30-39 MIN: ICD-10-PCS | Mod: S$PBB,,, | Performed by: NURSE PRACTITIONER

## 2023-11-16 RX ORDER — FLUCONAZOLE 150 MG/1
150 TABLET ORAL ONCE
Qty: 1 TABLET | Refills: 0 | Status: SHIPPED | OUTPATIENT
Start: 2023-11-16 | End: 2023-11-16

## 2023-11-16 RX ORDER — EPINEPHRINE 0.3 MG/.3ML
INJECTION SUBCUTANEOUS
COMMUNITY
Start: 2023-05-31

## 2023-11-16 RX ORDER — METRONIDAZOLE 500 MG/1
500 TABLET ORAL EVERY 12 HOURS
Qty: 14 TABLET | Refills: 0 | Status: SHIPPED | OUTPATIENT
Start: 2023-11-16 | End: 2023-11-23

## 2023-11-16 RX ORDER — AZELASTINE HCL 205.5 UG/1
SPRAY NASAL
COMMUNITY
Start: 2023-05-23

## 2023-11-16 RX ORDER — ALBUTEROL SULFATE 90 UG/1
2 AEROSOL, METERED RESPIRATORY (INHALATION)
COMMUNITY
Start: 2023-05-31

## 2023-11-16 RX ORDER — ACYCLOVIR 400 MG/1
400 TABLET ORAL 3 TIMES DAILY
Qty: 21 TABLET | Refills: 0 | Status: SHIPPED | OUTPATIENT
Start: 2023-11-16 | End: 2023-11-23

## 2023-11-16 RX ORDER — LORATADINE 10 MG/1
10 TABLET ORAL
COMMUNITY
Start: 2023-05-31

## 2023-11-16 RX ORDER — OLOPATADINE HYDROCHLORIDE 1 MG/ML
SOLUTION/ DROPS OPHTHALMIC
COMMUNITY
Start: 2023-05-31

## 2023-11-16 NOTE — TELEPHONE ENCOUNTER
Please inform pt of bacterial vaginosis. This is not a sexually transmitted infection but is a disruption of her ph balance. Please instruct pt to avoid douching or scented products to vaginal area. I have sent a flagyl prescription the pharmacy on file. She should avoid alcohol while taking this medication and up to 48 hours after completion.

## 2023-11-16 NOTE — PROGRESS NOTES
"Patient ID: Himanshu Mallory is a 28 y.o. female.    Chief Complaint: Vaginal Irritation      Review of patient's allergies indicates:  No Known Allergies          HPI:  The patient is  (csectionx1 secondary to nuchal cord) here with c/o vulvar irritation. States has been scratching so much that she noticed a tender lesion. Denies using scented products .Denies new partner. LMP 2023.Denies hx of HSV.     Review of Systems:   Negative except for findings in HPI     Objective:   /80   Pulse 82   Temp 99.1 °F (37.3 °C) (Oral)   Resp 18   Ht 5' 4" (1.626 m)   Wt 74.5 kg (164 lb 3.2 oz)   LMP 2023 (Exact Date)   SpO2 98%   BMI 28.18 kg/m²    Physical Exam:  GENERAL: Pt is aware and alert and  in no acute distress.  VULVA:  2mm ulcer noted to left lower labia majora;no surrounding erythema; tender to touch.  URETHRA: No lesions  BLADDER: No tenderness.  VAGINA: Mucosa normal,white, clumpy discharge present; no lesions.  CERVIX:  no CMT, NO discharge; NO lesions  BIMANUAL EXAM: reveals a 8-week-sized uterus. The uterus is mobile, nontender, no palpable masses. Celestine adnexa reveal no evidence of masses; no fullness   SKIN: Warm and Dry  PSYCHIATRIC: Patient is awake and alert. Mood and affect are normal.    Assessment:   Vulvar irritation    Vulvar lesion  -     SYPHILIS ANTIBODY (WITH REFLEX RPR); Future; Expected date: 2023  -     Herpes simplex virus culture [facility]; Vagina; Future; Expected date: 2023  -     HSV 1 & 2, IgG; Future; Expected date: 2023  -     Miscellaneous Test, Sendout HSV IgM; Future; Expected date: 2023  -     HSV by Rapid PCR, Non-Blood    Routine screening for STI (sexually transmitted infection)  -     Wet Prep, Genital  -     HIV 1/2 Ag/Ab (4th Gen); Future; Expected date: 2023  -     SYPHILIS ANTIBODY (WITH REFLEX RPR); Future; Expected date: 2023  -     Hepatitis B Surface Antigen; Future; Expected date: 2023  -     Hepatitis C " Antibody; Future; Expected date: 11/16/2023  -     Chlamydia/GC, PCR    Other orders  -     fluconazole (DIFLUCAN) 150 MG Tab; Take 1 tablet (150 mg total) by mouth once. for 1 dose  Dispense: 1 tablet; Refill: 0  -     acyclovir (ZOVIRAX) 400 MG tablet; Take 1 tablet (400 mg total) by mouth 3 (three) times daily. for 7 days  Dispense: 21 tablet; Refill: 0            1. Vulvar irritation    2. Vulvar lesion    3. Routine screening for STI (sexually transmitted infection)             -avoid scented products to vaginal area  -HSV cx sent; will begin treatment with acylovir while labs are pending  Plan:       Follow up for keep annual gyn appt.

## 2023-11-17 ENCOUNTER — TELEPHONE (OUTPATIENT)
Dept: GYNECOLOGY | Facility: CLINIC | Age: 28
End: 2023-11-17
Payer: MEDICAID

## 2023-11-20 LAB
HSV1 DNA SPEC QL NAA+PROBE: NEGATIVE
HSV2 DNA SPEC QL NAA+PROBE: POSITIVE
SPECIMEN SOURCE: ABNORMAL

## 2023-11-21 ENCOUNTER — TELEPHONE (OUTPATIENT)
Dept: GYNECOLOGY | Facility: CLINIC | Age: 28
End: 2023-11-21
Payer: MEDICAID

## 2023-11-21 RX ORDER — ACYCLOVIR 800 MG/1
800 TABLET ORAL 3 TIMES DAILY
Qty: 6 TABLET | Refills: 3 | Status: SHIPPED | OUTPATIENT
Start: 2023-11-21 | End: 2024-02-21 | Stop reason: SDUPTHER

## 2023-11-21 NOTE — TELEPHONE ENCOUNTER
Pt informed of results of herpes swab.Educated pt on disease process. Refill sent to pharmacy for future outbreaks. Encouraged safe sex practices

## 2024-02-21 ENCOUNTER — OFFICE VISIT (OUTPATIENT)
Dept: GYNECOLOGY | Facility: CLINIC | Age: 29
End: 2024-02-21
Payer: MEDICAID

## 2024-02-21 VITALS
OXYGEN SATURATION: 100 % | WEIGHT: 162.31 LBS | SYSTOLIC BLOOD PRESSURE: 127 MMHG | HEART RATE: 98 BPM | BODY MASS INDEX: 27.71 KG/M2 | RESPIRATION RATE: 18 BRPM | TEMPERATURE: 99 F | DIASTOLIC BLOOD PRESSURE: 85 MMHG | HEIGHT: 64 IN

## 2024-02-21 DIAGNOSIS — Z86.19 HX OF HERPES SIMPLEX INFECTION: ICD-10-CM

## 2024-02-21 DIAGNOSIS — Z01.419 WOMEN'S ANNUAL ROUTINE GYNECOLOGICAL EXAMINATION: Primary | ICD-10-CM

## 2024-02-21 PROCEDURE — 1159F MED LIST DOCD IN RCRD: CPT | Mod: CPTII,,, | Performed by: NURSE PRACTITIONER

## 2024-02-21 PROCEDURE — 3079F DIAST BP 80-89 MM HG: CPT | Mod: CPTII,,, | Performed by: NURSE PRACTITIONER

## 2024-02-21 PROCEDURE — 99213 OFFICE O/P EST LOW 20 MIN: CPT | Mod: PBBFAC | Performed by: NURSE PRACTITIONER

## 2024-02-21 PROCEDURE — 3008F BODY MASS INDEX DOCD: CPT | Mod: CPTII,,, | Performed by: NURSE PRACTITIONER

## 2024-02-21 PROCEDURE — 3074F SYST BP LT 130 MM HG: CPT | Mod: CPTII,,, | Performed by: NURSE PRACTITIONER

## 2024-02-21 PROCEDURE — 99395 PREV VISIT EST AGE 18-39: CPT | Mod: S$PBB,,, | Performed by: NURSE PRACTITIONER

## 2024-02-21 PROCEDURE — 1160F RVW MEDS BY RX/DR IN RCRD: CPT | Mod: CPTII,,, | Performed by: NURSE PRACTITIONER

## 2024-02-21 RX ORDER — ACYCLOVIR 800 MG/1
800 TABLET ORAL 3 TIMES DAILY
Qty: 6 TABLET | Refills: 4 | Status: SHIPPED | OUTPATIENT
Start: 2024-02-21 | End: 2024-02-23

## 2024-02-21 NOTE — PROGRESS NOTES
"Patient ID: Himanshu Mallory is a 28 y.o. female.    Chief Complaint: Annual Exam      Review of patient's allergies indicates:  No Known Allergies      HPI:  The patient is  (csectionx1 secondary to nuchal cord) here for annual gyn exam. Denies hx of abnormal pap. Last pap 2023-NIL. LMP 2024.Denies abnormal bleeding/discharge. Denies abd/pelvic pain. Denies breast complaints. C/o occasional vulvar itching. Pt diagnosed with herpes simplex 2023 from PCR cx to vulva. States had 2 outbreaks since her diagnosis. She is not currently sexually active. She waxes for grooming. Pt is not on contraception and declines today.  States used depo provera in the past but stopped after 2 injections d/t prolonged bleeding. She also tried nuva ring but stopped as it was painful. Denies fly hx of breast, ovarian, uterine or colon cancer. Works as Pharmacy Tech.    Review of Systems:   Negative except for findings in HPI     Objective:   /85   Pulse 98   Temp 99.1 °F (37.3 °C) (Oral)   Resp 18   Ht 5' 4" (1.626 m)   Wt 73.6 kg (162 lb 4.8 oz)   LMP 2024 (Exact Date)   SpO2 100%   BMI 27.86 kg/m²    Physical Exam:  GENERAL: Pt is aware and alert and  in no acute distress.  BREASTS: Bilateral-No masses, nipple discharge, skin changes, or tenderness.  ABDOMEN: Soft, non tender.  VULVA:  No lesions or skin changes.  URETHRA: No lesions  BLADDER: No tenderness.  VAGINA: Mucosa normal,no discharge; no lesions.  CERVIX:  no CMT, NO discharge; NO lesions  BIMANUAL EXAM: reveals a 8-week-sized uterus. The uterus is mobile, nontender, no palpable masses. Celestine adnexa reveal no evidence of masses; no fullness   SKIN: Warm and Dry  PSYCHIATRIC: Patient is awake and alert. Mood and affect are normal.    Assessment:   Women's annual routine gynecological examination    Hx of herpes simplex infection  -     acyclovir (ZOVIRAX) 800 MG Tab; Take 1 tablet (800 mg total) by mouth 3 (three) times daily. Take within 24-48 " hours of outbreak for 2 days  Dispense: 6 tablet; Refill: 4            1. Women's annual routine gynecological examination    2. Hx of herpes simplex infection             -pap UTD per ACOG guidelines  -Declined STI screening  -Declined contraception  -refill acyclovir to take as needed for outbreaks; avoid scented products to vagina/vulvar area; may use OTC aquaphor barrier ointment after grooming  Plan:       Follow up in about 1 year (around 2/21/2025).

## 2024-11-12 ENCOUNTER — OFFICE VISIT (OUTPATIENT)
Dept: FAMILY MEDICINE | Facility: CLINIC | Age: 29
End: 2024-11-12
Payer: MEDICAID

## 2024-11-12 VITALS
BODY MASS INDEX: 26.8 KG/M2 | HEART RATE: 88 BPM | HEIGHT: 64 IN | TEMPERATURE: 99 F | SYSTOLIC BLOOD PRESSURE: 125 MMHG | WEIGHT: 157 LBS | OXYGEN SATURATION: 98 % | RESPIRATION RATE: 18 BRPM | DIASTOLIC BLOOD PRESSURE: 88 MMHG

## 2024-11-12 DIAGNOSIS — R53.83 FATIGUE, UNSPECIFIED TYPE: ICD-10-CM

## 2024-11-12 DIAGNOSIS — F32.2 MAJOR DEPRESSIVE DISORDER, SINGLE EPISODE, SEVERE WITHOUT PSYCHOTIC FEATURES: ICD-10-CM

## 2024-11-12 DIAGNOSIS — J45.20 MILD INTERMITTENT ASTHMA WITHOUT COMPLICATION: ICD-10-CM

## 2024-11-12 DIAGNOSIS — Z23 NEED FOR VACCINATION: ICD-10-CM

## 2024-11-12 DIAGNOSIS — Z00.00 ENCOUNTER FOR WELLNESS EXAMINATION: Primary | ICD-10-CM

## 2024-11-12 DIAGNOSIS — F43.10 POSTTRAUMATIC STRESS DISORDER: ICD-10-CM

## 2024-11-12 DIAGNOSIS — F31.81 BIPOLAR II DISORDER, MOST RECENT EPISODE MAJOR DEPRESSIVE: ICD-10-CM

## 2024-11-12 DIAGNOSIS — L67.8 ABNORMAL FACIAL HAIR: ICD-10-CM

## 2024-11-12 DIAGNOSIS — J30.2 SEASONAL ALLERGIES: ICD-10-CM

## 2024-11-12 PROBLEM — R79.89 LOW TSH LEVEL: Status: RESOLVED | Noted: 2023-03-06 | Resolved: 2024-11-12

## 2024-11-12 LAB
25(OH)D3+25(OH)D2 SERPL-MCNC: 25 NG/ML (ref 30–80)
ALBUMIN SERPL-MCNC: 4.6 G/DL (ref 3.5–5)
ALBUMIN/GLOB SERPL: 1.2 RATIO (ref 1.1–2)
ALP SERPL-CCNC: 59 UNIT/L (ref 40–150)
ALT SERPL-CCNC: 7 UNIT/L (ref 0–55)
ANION GAP SERPL CALC-SCNC: 9 MEQ/L
AST SERPL-CCNC: 13 UNIT/L (ref 5–34)
BACTERIA #/AREA URNS AUTO: ABNORMAL /HPF
BASOPHILS # BLD AUTO: 0.04 X10(3)/MCL
BASOPHILS NFR BLD AUTO: 0.9 %
BILIRUB SERPL-MCNC: 0.8 MG/DL
BILIRUB UR QL STRIP.AUTO: NEGATIVE
BUN SERPL-MCNC: 9 MG/DL (ref 7–18.7)
CALCIUM SERPL-MCNC: 9.9 MG/DL (ref 8.4–10.2)
CHLORIDE SERPL-SCNC: 105 MMOL/L (ref 98–107)
CHOLEST SERPL-MCNC: 191 MG/DL
CHOLEST/HDLC SERPL: 3 {RATIO} (ref 0–5)
CLARITY UR: CLEAR
CO2 SERPL-SCNC: 25 MMOL/L (ref 22–29)
COLOR UR AUTO: ABNORMAL
CREAT SERPL-MCNC: 0.83 MG/DL (ref 0.55–1.02)
CREAT/UREA NIT SERPL: 11
EOSINOPHIL # BLD AUTO: 0.16 X10(3)/MCL (ref 0–0.9)
EOSINOPHIL NFR BLD AUTO: 3.7 %
ERYTHROCYTE [DISTWIDTH] IN BLOOD BY AUTOMATED COUNT: 15.1 % (ref 11.5–17)
EST. AVERAGE GLUCOSE BLD GHB EST-MCNC: 93.9 MG/DL
GFR SERPLBLD CREATININE-BSD FMLA CKD-EPI: >60 ML/MIN/1.73/M2
GLOBULIN SER-MCNC: 4 GM/DL (ref 2.4–3.5)
GLUCOSE SERPL-MCNC: 91 MG/DL (ref 74–100)
GLUCOSE UR QL STRIP: NORMAL
HAV IGM SERPL QL IA: NONREACTIVE
HBA1C MFR BLD: 4.9 %
HBV CORE IGM SERPL QL IA: NONREACTIVE
HBV SURFACE AG SERPL QL IA: NONREACTIVE
HCT VFR BLD AUTO: 37.2 % (ref 37–47)
HCV AB SERPL QL IA: NONREACTIVE
HDLC SERPL-MCNC: 55 MG/DL (ref 35–60)
HGB BLD-MCNC: 12.3 G/DL (ref 12–16)
HGB UR QL STRIP: NEGATIVE
HIV 1+2 AB+HIV1 P24 AG SERPL QL IA: NONREACTIVE
HYALINE CASTS #/AREA URNS LPF: ABNORMAL /LPF
IMM GRANULOCYTES # BLD AUTO: 0 X10(3)/MCL (ref 0–0.04)
IMM GRANULOCYTES NFR BLD AUTO: 0 %
KETONES UR QL STRIP: NEGATIVE
LDLC SERPL CALC-MCNC: 126 MG/DL (ref 50–140)
LEUKOCYTE ESTERASE UR QL STRIP: NEGATIVE
LYMPHOCYTES # BLD AUTO: 2.18 X10(3)/MCL (ref 0.6–4.6)
LYMPHOCYTES NFR BLD AUTO: 50.3 %
MCH RBC QN AUTO: 24.6 PG (ref 27–31)
MCHC RBC AUTO-ENTMCNC: 33.1 G/DL (ref 33–36)
MCV RBC AUTO: 74.5 FL (ref 80–94)
MONOCYTES # BLD AUTO: 0.33 X10(3)/MCL (ref 0.1–1.3)
MONOCYTES NFR BLD AUTO: 7.6 %
MUCOUS THREADS URNS QL MICRO: ABNORMAL /LPF
NEUTROPHILS # BLD AUTO: 1.62 X10(3)/MCL (ref 2.1–9.2)
NEUTROPHILS NFR BLD AUTO: 37.5 %
NITRITE UR QL STRIP: NEGATIVE
NRBC BLD AUTO-RTO: 0 %
PH UR STRIP: 6 [PH]
PLATELET # BLD AUTO: 469 X10(3)/MCL (ref 130–400)
PMV BLD AUTO: 9.5 FL (ref 7.4–10.4)
POTASSIUM SERPL-SCNC: 4 MMOL/L (ref 3.5–5.1)
PROT SERPL-MCNC: 8.6 GM/DL (ref 6.4–8.3)
PROT UR QL STRIP: ABNORMAL
RBC # BLD AUTO: 4.99 X10(6)/MCL (ref 4.2–5.4)
RBC #/AREA URNS AUTO: ABNORMAL /HPF
SODIUM SERPL-SCNC: 139 MMOL/L (ref 136–145)
SP GR UR STRIP.AUTO: 1.02 (ref 1–1.03)
SQUAMOUS #/AREA URNS LPF: ABNORMAL /HPF
T PALLIDUM AB SER QL: NONREACTIVE
T4 FREE SERPL-MCNC: 0.95 NG/DL (ref 0.7–1.48)
TRIGL SERPL-MCNC: 49 MG/DL (ref 37–140)
TSH SERPL-ACNC: 0.5 UIU/ML (ref 0.35–4.94)
UROBILINOGEN UR STRIP-ACNC: NORMAL
VIT B12 SERPL-MCNC: 789 PG/ML (ref 213–816)
VLDLC SERPL CALC-MCNC: 10 MG/DL
WBC # BLD AUTO: 4.33 X10(3)/MCL (ref 4.5–11.5)
WBC #/AREA URNS AUTO: ABNORMAL /HPF

## 2024-11-12 PROCEDURE — 3079F DIAST BP 80-89 MM HG: CPT | Mod: CPTII,,, | Performed by: NURSE PRACTITIONER

## 2024-11-12 PROCEDURE — 3008F BODY MASS INDEX DOCD: CPT | Mod: CPTII,,, | Performed by: NURSE PRACTITIONER

## 2024-11-12 PROCEDURE — 80053 COMPREHEN METABOLIC PANEL: CPT | Performed by: NURSE PRACTITIONER

## 2024-11-12 PROCEDURE — 82607 VITAMIN B-12: CPT | Performed by: NURSE PRACTITIONER

## 2024-11-12 PROCEDURE — 87389 HIV-1 AG W/HIV-1&-2 AB AG IA: CPT | Performed by: NURSE PRACTITIONER

## 2024-11-12 PROCEDURE — 84439 ASSAY OF FREE THYROXINE: CPT | Performed by: NURSE PRACTITIONER

## 2024-11-12 PROCEDURE — 82306 VITAMIN D 25 HYDROXY: CPT | Performed by: NURSE PRACTITIONER

## 2024-11-12 PROCEDURE — 36415 COLL VENOUS BLD VENIPUNCTURE: CPT | Performed by: NURSE PRACTITIONER

## 2024-11-12 PROCEDURE — 86780 TREPONEMA PALLIDUM: CPT | Performed by: NURSE PRACTITIONER

## 2024-11-12 PROCEDURE — 81001 URINALYSIS AUTO W/SCOPE: CPT | Performed by: NURSE PRACTITIONER

## 2024-11-12 PROCEDURE — 99395 PREV VISIT EST AGE 18-39: CPT | Mod: S$PBB,,, | Performed by: NURSE PRACTITIONER

## 2024-11-12 PROCEDURE — 1159F MED LIST DOCD IN RCRD: CPT | Mod: CPTII,,, | Performed by: NURSE PRACTITIONER

## 2024-11-12 PROCEDURE — 83036 HEMOGLOBIN GLYCOSYLATED A1C: CPT | Performed by: NURSE PRACTITIONER

## 2024-11-12 PROCEDURE — 90656 IIV3 VACC NO PRSV 0.5 ML IM: CPT | Mod: PBBFAC,PN

## 2024-11-12 PROCEDURE — 85025 COMPLETE CBC W/AUTO DIFF WBC: CPT | Performed by: NURSE PRACTITIONER

## 2024-11-12 PROCEDURE — 80074 ACUTE HEPATITIS PANEL: CPT | Performed by: NURSE PRACTITIONER

## 2024-11-12 PROCEDURE — 3074F SYST BP LT 130 MM HG: CPT | Mod: CPTII,,, | Performed by: NURSE PRACTITIONER

## 2024-11-12 PROCEDURE — 84443 ASSAY THYROID STIM HORMONE: CPT | Performed by: NURSE PRACTITIONER

## 2024-11-12 PROCEDURE — 1160F RVW MEDS BY RX/DR IN RCRD: CPT | Mod: CPTII,,, | Performed by: NURSE PRACTITIONER

## 2024-11-12 PROCEDURE — 80061 LIPID PANEL: CPT | Performed by: NURSE PRACTITIONER

## 2024-11-12 PROCEDURE — 99213 OFFICE O/P EST LOW 20 MIN: CPT | Mod: 25,S$PBB,, | Performed by: NURSE PRACTITIONER

## 2024-11-12 PROCEDURE — 90471 IMMUNIZATION ADMIN: CPT | Mod: PBBFAC,PN

## 2024-11-12 PROCEDURE — 99215 OFFICE O/P EST HI 40 MIN: CPT | Mod: PBBFAC,PN,25 | Performed by: NURSE PRACTITIONER

## 2024-11-12 RX ADMIN — INFLUENZA VIRUS VACCINE 0.5 ML: 15; 15; 15 SUSPENSION INTRAMUSCULAR at 08:11

## 2024-11-12 NOTE — ASSESSMENT & PLAN NOTE
Referral to DEANNE Cash NP.  Previously counseled by FRANCIS Cedillo NP.   Denies SI/HI  Read positive daily meditations, avoid negative media, set healthy boundaries.  Exercise daily, keep consistent sleep pattern, eat a healthy diet.  Establish good social support, make changes to reduce stress.  Reports any symptoms of suicidal/homicidal ideations or self harm immediately, if clinic is closed go to nearest emergency room.

## 2024-11-12 NOTE — PROGRESS NOTES
Patient Name: Himanshu Mallory     : 1995    MRN: [id number]     Subjective:     Patient ID: Himanshu Mallory is a 29 y.o. female.    Chief Complaint:   Chief Complaint   Patient presents with    Follow-up     Pt is here for follow up. Pt c/o increased fatigue. Pt request BH and gyn referrals        HPI: 24:  Pt is here for wellness.  Hx depression, bipolar II, asthma, HSV infection.     Today, pt c/o increased fatigue, depression.     Depression/Bipolar Disorder/PTSD-  Pt was receiving counseling by Ms. Nguyen NP. She was not amenable to medications at that time due to side effects of medications.  She is now amenable to treatment options.  States that Ms. Nguyen NP had put her on a medication that worsened her Bipolar disorder. She does agree with her diagnosis.      Facial Hair-  Is established with GYN clinic at [facility], last appointment in February for female wellness.  She believes that she has PCOS due to hirsutism below her chin area. She is also having irregular periods.  LMP stated 24.  States she may be having 2 periods a month sometimes heavy.         ROS:      Review of Systems   Constitutional:  Positive for malaise/fatigue.   Psychiatric/Behavioral:  Positive for depression. Negative for hallucinations, substance abuse and suicidal ideas.    All other systems reviewed and are negative.         History:     Past Medical History:   Diagnosis Date    Asthma     Depression     HSV (herpes simplex virus) infection     Low TSH level 2023        Past Surgical History:   Procedure Laterality Date     SECTION      SKIN GRAFT         Family History   Problem Relation Name Age of Onset    No Known Problems Father      No Known Problems Mother          Social History     Tobacco Use    Smoking status: Never     Passive exposure: Never    Smokeless tobacco: Never   Substance and Sexual Activity    Alcohol use: Yes     Comment: occasionally    Drug use: Never    Sexual activity: Not  "Currently       Current Outpatient Medications   Medication Instructions    acyclovir (ZOVIRAX) 800 mg, Oral, 3 times daily, Take within 24-48 hours of outbreak    albuterol (PROVENTIL/VENTOLIN HFA) 90 mcg/actuation inhaler 2 puffs, Every 4-6 hours PRN    cetirizine (ZYRTEC) 10 mg, Daily    diphenhydrAMINE (BENADRYL) 50 mg, Every 6 hours PRN    EPINEPHrine (EPIPEN) 0.3 mg/0.3 mL AtIn SMARTSI pre-filled pen syringe IM PRN    fluticasone propionate (FLONASE) 50 mcg, Each Nostril, Daily    loratadine (CLARITIN) 10 mg    olopatadine (PATANOL) 0.1 % ophthalmic solution SMARTSI Drop(s) In Eye(s) Twice Daily PRN        Review of patient's allergies indicates:  No Known Allergies    Objective:     Visit Vitals  /88 (BP Location: Left arm, Patient Position: Sitting)   Pulse 88   Temp 98.7 °F (37.1 °C) (Oral)   Resp 18   Ht 5' 4" (1.626 m)   Wt 71.2 kg (157 lb)   LMP 2024 (Approximate)   SpO2 98%   BMI 26.95 kg/m²       Physical Examination:     Physical Exam  Vitals and nursing note reviewed.   Constitutional:       General: She is awake.      Appearance: Normal appearance. She is well-developed and well-groomed.   HENT:      Head: Normocephalic and atraumatic.      Right Ear: Hearing, tympanic membrane, ear canal and external ear normal.      Left Ear: Hearing, tympanic membrane, ear canal and external ear normal.      Nose: Nose normal.      Mouth/Throat:      Lips: Pink.      Mouth: Mucous membranes are moist.      Tongue: No lesions.      Pharynx: Oropharynx is clear. No posterior oropharyngeal erythema.   Eyes:      General: Lids are normal. Vision grossly intact.      Extraocular Movements: Extraocular movements intact.      Conjunctiva/sclera: Conjunctivae normal.      Pupils: Pupils are equal, round, and reactive to light.   Neck:      Thyroid: No thyroid mass.      Vascular: No carotid bruit.      Trachea: Trachea and phonation normal.   Cardiovascular:      Rate and Rhythm: Normal rate and regular " rhythm.      Pulses: Normal pulses.      Heart sounds: Normal heart sounds, S1 normal and S2 normal.   Pulmonary:      Effort: Pulmonary effort is normal.      Breath sounds: Normal breath sounds. No decreased breath sounds, wheezing, rhonchi or rales.   Abdominal:      General: Abdomen is flat. Bowel sounds are normal.      Palpations: Abdomen is soft.      Tenderness: There is no abdominal tenderness.   Musculoskeletal:         General: Normal range of motion.      Cervical back: Full passive range of motion without pain and normal range of motion.      Right lower leg: No edema.      Left lower leg: No edema.   Lymphadenopathy:      Cervical: No cervical adenopathy.   Skin:     General: Skin is warm and dry.      Capillary Refill: Capillary refill takes less than 2 seconds.   Neurological:      General: No focal deficit present.      Mental Status: She is alert and oriented to person, place, and time.      GCS: GCS eye subscore is 4. GCS verbal subscore is 5. GCS motor subscore is 6.      Cranial Nerves: Cranial nerves 2-12 are intact.      Sensory: Sensation is intact.      Motor: Motor function is intact.      Coordination: Coordination is intact.      Gait: Gait is intact.   Psychiatric:         Attention and Perception: Attention and perception normal.         Mood and Affect: Mood normal.         Speech: Speech normal.         Behavior: Behavior normal. Behavior is cooperative.         Thought Content: Thought content normal.         Cognition and Memory: Cognition and memory normal.         Lab Results:     Chemistry:  Lab Results   Component Value Date     02/13/2023    K 4.5 02/13/2023    BUN 10.0 02/13/2023    CREATININE 0.91 02/13/2023    EGFRNORACEVR >60 02/13/2023    GLUCOSE 90 02/13/2023    CALCIUM 10.0 02/13/2023    ALKPHOS 68 02/13/2023    LABPROT 8.6 (H) 02/13/2023    ALBUMIN 4.6 02/13/2023    BILIDIR 0.5 02/05/2022    IBILI 0.60 02/05/2022    AST 17 02/13/2023    ALT 11 02/13/2023    TSH  0.410 03/13/2023    IBDLXW6YHTO 0.98 02/13/2023        Lab Results   Component Value Date    HGBA1C 4.9 02/13/2023        Hematology:  Lab Results   Component Value Date    WBC 4.9 02/13/2023    HGB 12.1 02/13/2023    HCT 35.9 (L) 02/13/2023     (H) 02/13/2023       Lipid Panel:  Lab Results   Component Value Date    CHOL 199 02/13/2023    HDL 67 (H) 02/13/2023    .00 02/13/2023    TRIG 45 02/13/2023    TOTALCHOLEST 3 02/13/2023        Urine:  Lab Results   Component Value Date    APPEARANCEUA Clear 02/13/2023    SGUA 1.021 02/13/2023    PROTEINUA Negative 02/13/2023    KETONESUA Negative 02/13/2023    LEUKOCYTESUR Negative 02/13/2023    RBCUA 0-5 02/13/2023    WBCUA 0-5 02/13/2023    BACTERIA None Seen 02/13/2023    SQEPUA Trace (A) 02/13/2023    HYALINECASTS None Seen 02/13/2023        Assessment:          ICD-10-CM ICD-9-CM   1. Encounter for wellness examination  Z00.00 V70.0   2. Fatigue, unspecified type  R53.83 780.79   3. Abnormal facial hair  L67.8 704.1   4. Need for vaccination  Z23 V05.9   5. Bipolar II disorder, most recent episode major depressive  F31.81 296.89   6. Major depressive disorder, single episode, severe without psychotic features  F32.2 296.23   7. Posttraumatic stress disorder  F43.10 309.81   8. Seasonal allergies  J30.2 477.9   9. Mild intermittent asthma without complication  J45.20 493.90        Plan:     1. Encounter for wellness examination  -     CBC Auto Differential  -     Comprehensive Metabolic Panel  -     Urinalysis  -     Hemoglobin A1C  -     TSH  -     T4, Free  -     Vitamin D  -     Vitamin B12  -     Hepatitis Panel, Acute  -     HIV 1/2 Ag/Ab (4th Gen)  -     SYPHILIS ANTIBODY (WITH REFLEX RPR)  -     Lipid Panel    2. Fatigue, unspecified type  Assessment & Plan:  Labs today including Vitamin D, B12 level, CBC, CMP, TSH, Free T4, etc      3. Abnormal facial hair  Assessment & Plan:  Pt thinks she has PCOS due to facial hair presence.  She is established  with GYN clinic. Next appointment 2/2025.  Encouraged her to reach out to that clinic for a sooner appointment with this problem for workup.       4. Need for vaccination  -     influenza (Flulaval, Fluzone, Fluarix) 45 mcg/0.5 mL IM vaccine (> or = 6 mo) 0.5 mL    5. Bipolar II disorder, most recent episode major depressive  Assessment & Plan:  Referral to DEANNE Cash NP.  She was previously counseled by FRANCIS Cedillo NP.     ER for SI/HI  Read positive daily meditations, avoid negative media, set healthy boundaries.  Exercise daily, keep consistent sleep pattern, eat a healthy diet.  Establish good social support, make changes to reduce stress.  Reports any symptoms of suicidal/homicidal ideations or self harm immediately, if clinic is closed go to nearest emergency room.      Orders:  -     Ambulatory referral/consult to Behavioral Health; Future; Expected date: 11/19/2024    6. Major depressive disorder, single episode, severe without psychotic features  Assessment & Plan:  Referral to DEANNE Cash NP.  Previously counseled by FRANCIS Cedillo NP.   Denies SI/HI  Read positive daily meditations, avoid negative media, set healthy boundaries.  Exercise daily, keep consistent sleep pattern, eat a healthy diet.  Establish good social support, make changes to reduce stress.  Reports any symptoms of suicidal/homicidal ideations or self harm immediately, if clinic is closed go to nearest emergency room.      Orders:  -     Ambulatory referral/consult to Behavioral Health; Future; Expected date: 11/19/2024    7. Posttraumatic stress disorder  Assessment & Plan:  Referred to Dr. Loyola last February.  Referral placed today to DEANNE Cash NP.     Orders:  -     Ambulatory referral/consult to Behavioral Health; Future; Expected date: 11/19/2024    8. Seasonal allergies  Assessment & Plan:  Flonase and Zyrtec, Claritin or Xyzal.    Refer to Asthma and Immunology for care  Avoid/limit triggers such as pollen, dust, molds and  animal dander.  Avoid smoke, strong chemicals, and strong cleaning products in addition to strong perfumes/colognes.  Use rescue inhaler when needed, use nebulizer for wheezing or URI.  Report Upper Respiratory Infection in early stages and seek treatment.          9. Mild intermittent asthma without complication  Assessment & Plan:  Use your rescue inhaler and nebulizer for acute asthma symptoms when they occur. These are your rescue medications and help to relax tight muscles around your airways. If you are having to use these medications more than 2x per week, please notify the clinic as this could indicate poor asthma control.  Avoid asthma triggers (i.e., pet dander, molds, dust mites, cockroaches, pollen, cigarette smoke, respiratory illnesses, etc)  Stay up-to-date with immunizations, especially the influenza and pneumonia vaccinations             Follow up in about 2 weeks (around 11/26/2024) for Review of labs.    Future Appointments   Date Time Provider Department Center   11/26/2024  8:00 AM Miriam Lazo FNP [facility]   2/26/2025  2:00 PM Belen Oro FNP [facility]        DEVIN Jose

## 2024-11-12 NOTE — ASSESSMENT & PLAN NOTE
Referral to DEANNE Cash NP.  She was previously counseled by FRANCIS Cedillo NP.     ER for SI/HI  Read positive daily meditations, avoid negative media, set healthy boundaries.  Exercise daily, keep consistent sleep pattern, eat a healthy diet.  Establish good social support, make changes to reduce stress.  Reports any symptoms of suicidal/homicidal ideations or self harm immediately, if clinic is closed go to nearest emergency room.

## 2024-11-12 NOTE — ASSESSMENT & PLAN NOTE
Pt thinks she has PCOS due to facial hair presence.  She is established with GYN clinic. Next appointment 2/2025.  Encouraged her to reach out to that clinic for a sooner appointment with this problem for workup.

## 2024-11-22 ENCOUNTER — TELEPHONE (OUTPATIENT)
Dept: GYNECOLOGY | Facility: CLINIC | Age: 29
End: 2024-11-22
Payer: MEDICAID

## 2024-11-22 NOTE — TELEPHONE ENCOUNTER
----- Message from Mina sent at 11/22/2024  8:23 AM CST -----  The above pt is requesting an appt for possible PCOS. Please advise, thanks

## 2024-11-22 NOTE — TELEPHONE ENCOUNTER
Pt stated she feels like she has PCOS. She was looking up information on the internet and she spoke with her PCP and she informed her that she needs to f/u with her GYN provider. She stated she is having facial hair and her cycles are heavy sometimes and she changes every 30-45 minutes on her heavy days. Her LMP was 11/7/2024.    Informed pt the provider is currently out on today and will return on 12/3/24.    Pt verbalized understanding.

## 2024-11-27 ENCOUNTER — OFFICE VISIT (OUTPATIENT)
Dept: FAMILY MEDICINE | Facility: CLINIC | Age: 29
End: 2024-11-27
Payer: MEDICAID

## 2024-11-27 VITALS
RESPIRATION RATE: 18 BRPM | DIASTOLIC BLOOD PRESSURE: 87 MMHG | OXYGEN SATURATION: 98 % | WEIGHT: 155 LBS | HEIGHT: 64 IN | HEART RATE: 69 BPM | BODY MASS INDEX: 26.46 KG/M2 | SYSTOLIC BLOOD PRESSURE: 130 MMHG | TEMPERATURE: 98 F

## 2024-11-27 DIAGNOSIS — F32.2 MAJOR DEPRESSIVE DISORDER, SINGLE EPISODE, SEVERE WITHOUT PSYCHOTIC FEATURES: ICD-10-CM

## 2024-11-27 DIAGNOSIS — L67.8 ABNORMAL FACIAL HAIR: ICD-10-CM

## 2024-11-27 DIAGNOSIS — J30.2 SEASONAL ALLERGIES: ICD-10-CM

## 2024-11-27 DIAGNOSIS — R53.83 FATIGUE, UNSPECIFIED TYPE: ICD-10-CM

## 2024-11-27 DIAGNOSIS — E55.9 VITAMIN D INSUFFICIENCY: Primary | ICD-10-CM

## 2024-11-27 PROCEDURE — 99214 OFFICE O/P EST MOD 30 MIN: CPT | Mod: PBBFAC,PN | Performed by: NURSE PRACTITIONER

## 2024-11-27 PROCEDURE — 99214 OFFICE O/P EST MOD 30 MIN: CPT | Mod: S$PBB,,, | Performed by: NURSE PRACTITIONER

## 2024-11-27 PROCEDURE — 1160F RVW MEDS BY RX/DR IN RCRD: CPT | Mod: CPTII,,, | Performed by: NURSE PRACTITIONER

## 2024-11-27 PROCEDURE — 3079F DIAST BP 80-89 MM HG: CPT | Mod: CPTII,,, | Performed by: NURSE PRACTITIONER

## 2024-11-27 PROCEDURE — 3008F BODY MASS INDEX DOCD: CPT | Mod: CPTII,,, | Performed by: NURSE PRACTITIONER

## 2024-11-27 PROCEDURE — 3075F SYST BP GE 130 - 139MM HG: CPT | Mod: CPTII,,, | Performed by: NURSE PRACTITIONER

## 2024-11-27 PROCEDURE — 3044F HG A1C LEVEL LT 7.0%: CPT | Mod: CPTII,,, | Performed by: NURSE PRACTITIONER

## 2024-11-27 PROCEDURE — 1159F MED LIST DOCD IN RCRD: CPT | Mod: CPTII,,, | Performed by: NURSE PRACTITIONER

## 2024-11-27 RX ORDER — ASPIRIN 325 MG
50000 TABLET, DELAYED RELEASE (ENTERIC COATED) ORAL
Qty: 12 CAPSULE | Refills: 0 | Status: SHIPPED | OUTPATIENT
Start: 2024-11-27

## 2024-11-27 RX ORDER — LORATADINE 10 MG/1
10 TABLET ORAL DAILY
Qty: 30 TABLET | Refills: 11 | Status: SHIPPED | OUTPATIENT
Start: 2024-11-27

## 2024-11-27 NOTE — ASSESSMENT & PLAN NOTE
Pt has called GYN clinic and her NP is out, they will reach out to her around 12/3 to schedule appointment

## 2024-11-27 NOTE — ASSESSMENT & PLAN NOTE
Vitamin D 25. All other labs were WNL. [place] that her fatigue is 2/2 depression that is not being treated.   Pt has missed her phone call to set up appt with Behavioral Health.   Encouraged to schedule appointment on the way out of here today.

## 2024-11-27 NOTE — PROGRESS NOTES
Patient Name: Himanshu Mallory     : 1995    MRN: [id number]     Subjective:     Patient ID: Himanshu Mallory is a 29 y.o. female.    Chief Complaint:   Chief Complaint   Patient presents with    Follow-up     Pt is here for follow up. Pt c/o productive cough with yellow mucus. Pt seen at [facility] urgent care 2024. Pt currently taking Zpak        HPI: 24: FU 2 week review of labs. Recently dx with URI at Urgent Care and treated with Z-pack.  was not able to contact to schedule appointment. Needs refill of her Claritin      24:  Pt is here for wellness.  Hx depression, bipolar II, asthma, HSV infection.     Today, pt c/o increased fatigue, depression.     Depression/Bipolar Disorder/PTSD-  Pt was receiving counseling by Ms. Nguyen NP. She was not amenable to medications at that time due to side effects of medications.  She is now amenable to treatment options.  States that Ms. Nguyen NP had put her on a medication that worsened her Bipolar disorder. She does agree with her diagnosis.      Facial Hair-  Is established with GYN clinic at [facility], last appointment in February for female wellness.  She believes that she has PCOS due to hirsutism below her chin area. She is also having irregular periods.  LMP stated 24.  States she may be having 2 periods a month sometimes heavy.               ROS:      Review of Systems   Psychiatric/Behavioral:  Positive for depression.    All other systems reviewed and are negative.           History:     Past Medical History:   Diagnosis Date    Asthma     Depression     HSV (herpes simplex virus) infection     Low TSH level 2023        Past Surgical History:   Procedure Laterality Date     SECTION      SKIN GRAFT         Family History   Problem Relation Name Age of Onset    No Known Problems Father      No Known Problems Mother          Social History     Tobacco Use    Smoking status: Never     Passive exposure: Never    Smokeless tobacco:  "Never   Substance and Sexual Activity    Alcohol use: Yes     Comment: occasionally    Drug use: Never    Sexual activity: Not Currently       Current Outpatient Medications   Medication Instructions    acyclovir (ZOVIRAX) 800 mg, Oral, 3 times daily, Take within 24-48 hours of outbreak    albuterol (PROVENTIL/VENTOLIN HFA) 90 mcg/actuation inhaler 2 puffs, Every 4-6 hours PRN    cholecalciferol (vitamin D3) 50,000 Units, Oral, Every 7 days    diphenhydrAMINE (BENADRYL) 50 mg, Every 6 hours PRN    EPINEPHrine (EPIPEN) 0.3 mg/0.3 mL AtIn SMARTSI pre-filled pen syringe IM PRN    fluticasone propionate (FLONASE) 50 mcg, Each Nostril, Daily    loratadine (CLARITIN) 10 mg, Oral, Daily    olopatadine (PATANOL) 0.1 % ophthalmic solution SMARTSI Drop(s) In Eye(s) Twice Daily PRN        Review of patient's allergies indicates:  No Known Allergies    Objective:     Visit Vitals  /87 (BP Location: Left arm, Patient Position: Sitting)   Pulse 69   Temp 98.3 °F (36.8 °C) (Oral)   Resp 18   Ht 5' 4" (1.626 m)   Wt 70.3 kg (155 lb)   LMP 2024 (Approximate)   SpO2 98%   BMI 26.61 kg/m²       Physical Examination:     Physical Exam  Vitals reviewed.   Constitutional:       Appearance: Normal appearance. She is normal weight.   HENT:      Head: Normocephalic.   Cardiovascular:      Rate and Rhythm: Normal rate and regular rhythm.      Pulses: Normal pulses.      Heart sounds: Normal heart sounds.   Pulmonary:      Effort: Pulmonary effort is normal.      Breath sounds: Normal breath sounds.   Abdominal:      General: Abdomen is flat.      Palpations: Abdomen is soft.   Musculoskeletal:         General: Normal range of motion.      Cervical back: Normal range of motion.   Skin:     General: Skin is warm and dry.   Neurological:      Mental Status: She is alert.   Psychiatric:         Mood and Affect: Mood normal.         Lab Results:     Chemistry:  Lab Results   Component Value Date     2024    K 4.0 " 11/12/2024    BUN 9.0 11/12/2024    CREATININE 0.83 11/12/2024    EGFRNORACEVR >60 11/12/2024    GLUCOSE 91 11/12/2024    CALCIUM 9.9 11/12/2024    ALKPHOS 59 11/12/2024    LABPROT 8.6 (H) 11/12/2024    ALBUMIN 4.6 11/12/2024    BILIDIR 0.5 02/05/2022    IBILI 0.60 02/05/2022    AST 13 11/12/2024    ALT 7 11/12/2024    [id number] 25 (L) 11/12/2024    TSH 0.496 11/12/2024    QFGPQT4PKWN 0.95 11/12/2024        Lab Results   Component Value Date    HGBA1C 4.9 11/12/2024        Hematology:  Lab Results   Component Value Date    WBC 4.33 (L) 11/12/2024    HGB 12.3 11/12/2024    HCT 37.2 11/12/2024     (H) 11/12/2024       Lipid Panel:  Lab Results   Component Value Date    CHOL 191 11/12/2024    HDL 55 11/12/2024    .00 11/12/2024    TRIG 49 11/12/2024    TOTALCHOLEST 3 11/12/2024        Urine:  Lab Results   Component Value Date    APPEARANCEUA Clear 11/12/2024    SGUA 1.023 11/12/2024    PROTEINUA Trace (A) 11/12/2024    KETONESUA Negative 11/12/2024    LEUKOCYTESUR Negative 11/12/2024    RBCUA 0-5 11/12/2024    WBCUA 0-5 11/12/2024    BACTERIA None Seen 11/12/2024    SQEPUA Moderate (A) 11/12/2024    HYALINECASTS None Seen 11/12/2024        Assessment:          ICD-10-CM ICD-9-CM   1. Vitamin D insufficiency  E55.9 268.9   2. Fatigue, unspecified type  R53.83 780.79   3. Seasonal allergies  J30.2 477.9   4. Abnormal facial hair  L67.8 704.1   5. Major depressive disorder, single episode, severe without psychotic features  F32.2 296.23        Plan:     1. Vitamin D insufficiency  -     cholecalciferol, vitamin D3, 1,250 mcg (50,000 unit) capsule; Take 1 capsule (50,000 Units total) by mouth every 7 days.  Dispense: 12 capsule; Refill: 0    2. Fatigue, unspecified type  Assessment & Plan:  Vitamin D 25. All other labs were WNL. [place] that her fatigue is 2/2 depression that is not being treated.   Pt has missed her phone call to set up appt with Behavioral Health.   Encouraged to schedule appointment on  the way out of here today.      3. Seasonal allergies  Assessment & Plan:  Flonase and Zyrtec, Claritin or Xyzal.    Refer to Asthma and Immunology for care  Avoid/limit triggers such as pollen, dust, molds and animal dander.  Avoid smoke, strong chemicals, and strong cleaning products in addition to strong perfumes/colognes.  Use rescue inhaler when needed, use nebulizer for wheezing or URI.  Report Upper Respiratory Infection in early stages and seek treatment.        Orders:  -     loratadine (CLARITIN) 10 mg tablet; Take 1 tablet (10 mg total) by mouth once daily.  Dispense: 30 tablet; Refill: 11    4. Abnormal facial hair  Assessment & Plan:  Pt has called GYN clinic and her NP is out, they will reach out to her around 12/3 to schedule appointment      5. Major depressive disorder, single episode, severe without psychotic features  Assessment & Plan:  Referral to DEANNE Cash NP.  Previously counseled by FRANCIS Cedillo NP.   Denies SI/HI  Read positive daily meditations, avoid negative media, set healthy boundaries.  Exercise daily, keep consistent sleep pattern, eat a healthy diet.  Establish good social support, make changes to reduce stress.  Reports any symptoms of suicidal/homicidal ideations or self harm immediately, if clinic is closed go to nearest emergency room.             Follow up in about 6 months (around 5/27/2025) for depresion, asthma.    Future Appointments   Date Time Provider Department Center   12/11/2024 12:00 PM Berenice Cash APRN [facility]   2/26/2025  2:00 PM Belen Oro FNP [facility] GYN [facility]   5/27/2025  8:00 AM Miriam Lazo FNP [facility]        DEVIN Jose

## 2024-12-11 ENCOUNTER — TELEPHONE (OUTPATIENT)
Dept: BEHAVIORAL HEALTH | Facility: CLINIC | Age: 29
End: 2024-12-11
Payer: MEDICAID

## 2024-12-11 NOTE — TELEPHONE ENCOUNTER
Spoke to patient  Stated she forgot about her scheduled appointment this morning, she is requesting the next NP appt with Ms. Ng  Next available NP appt is 1/15/25 @ 12:00  Pt accepted the appointment  Verbalized understanding  All questions answered  Call soila

## 2024-12-11 NOTE — TELEPHONE ENCOUNTER
----- Message from Sydni sent at 12/11/2024  2:29 PM CST -----  Who Called: Brisha Robertson    Caller is requesting a sooner appointment. Caller declined first available appointment listed below. Caller will not accept being placed on the waitlist and is requesting a message be sent to doctor.    When is the first available appointment?   Options offered (Virtual Visit, Urgent Care):    Symptoms:       Preferred Method of Contact: Phone Call  Patient's Preferred Phone Number on File: [phone]   Best Call Back Number, if different:  Additional Information:  pt calls in to set up an new appt/  pt missing appt on today

## 2025-01-15 DIAGNOSIS — Z86.19 HX OF HERPES SIMPLEX INFECTION: ICD-10-CM

## 2025-01-15 RX ORDER — ACYCLOVIR 800 MG/1
800 TABLET ORAL 3 TIMES DAILY
Qty: 6 TABLET | Refills: 4 | Status: SHIPPED | OUTPATIENT
Start: 2025-01-15 | End: 2025-01-17

## 2025-01-15 NOTE — TELEPHONE ENCOUNTER
Confirmed with pharmacy pt is out of refills    Jessica Rogers Staff  Pt called to ask for a refill on her acyclovir as she does not see Belen until 02/26/25. Please advise

## 2025-01-24 ENCOUNTER — TELEPHONE (OUTPATIENT)
Dept: GYNECOLOGY | Facility: CLINIC | Age: 30
End: 2025-01-24
Payer: MEDICAID

## 2025-01-24 NOTE — TELEPHONE ENCOUNTER
Informed pt that her RX was sent to the pharmacy on 1/15/25 and verified with the pharmacy(spoke with Amy) that the RX was sent and filled on 1/15/25 and the pt has not picked up her RX.    Pt verbalized understanding.              Himanshu Mallory Staff  Phone Number: [phone]     Refills have been requested for the following medications:        acyclovir (ZOVIRAX) 800 MG Tab [Belen Oro, [facility]]      Patient Comment: Called twice to get refill approved. First time I didnt receive a call back letting me know about the refill.  Second time I tried to contact the office no one answered or returned my phone call. I just need my refill until my next doctor visit.    Preferred pharmacy: [facility] [address] [place] [address]  Delivery method: Pickup

## 2025-03-05 ENCOUNTER — HOSPITAL ENCOUNTER (EMERGENCY)
Facility: HOSPITAL | Age: 30
Discharge: LEFT BEFORE DISCHARGE | End: 2025-03-06
Payer: MEDICAID

## 2025-03-05 ENCOUNTER — RESULTS FOLLOW-UP (OUTPATIENT)
Dept: GYNECOLOGY | Facility: CLINIC | Age: 30
End: 2025-03-05

## 2025-03-05 ENCOUNTER — OFFICE VISIT (OUTPATIENT)
Dept: GYNECOLOGY | Facility: CLINIC | Age: 30
End: 2025-03-05
Payer: MEDICAID

## 2025-03-05 VITALS
RESPIRATION RATE: 20 BRPM | DIASTOLIC BLOOD PRESSURE: 93 MMHG | SYSTOLIC BLOOD PRESSURE: 141 MMHG | BODY MASS INDEX: 28 KG/M2 | WEIGHT: 164 LBS | TEMPERATURE: 98 F | HEART RATE: 89 BPM | HEIGHT: 64 IN | OXYGEN SATURATION: 98 %

## 2025-03-05 VITALS
HEIGHT: 64 IN | WEIGHT: 190.19 LBS | BODY MASS INDEX: 32.47 KG/M2 | TEMPERATURE: 98 F | SYSTOLIC BLOOD PRESSURE: 117 MMHG | HEART RATE: 88 BPM | OXYGEN SATURATION: 100 % | DIASTOLIC BLOOD PRESSURE: 80 MMHG | RESPIRATION RATE: 18 BRPM

## 2025-03-05 DIAGNOSIS — Z11.3 SCREENING FOR STD (SEXUALLY TRANSMITTED DISEASE): ICD-10-CM

## 2025-03-05 DIAGNOSIS — Z01.419 WELL WOMAN EXAM WITH ROUTINE GYNECOLOGICAL EXAM: Primary | ICD-10-CM

## 2025-03-05 DIAGNOSIS — Z86.19 HX OF HERPES SIMPLEX INFECTION: ICD-10-CM

## 2025-03-05 LAB
B-HCG UR QL: NEGATIVE
C TRACH DNA SPEC QL NAA+PROBE: NOT DETECTED
CLUE CELLS VAG QL WET PREP: ABNORMAL
CTP QC/QA: YES
N GONORRHOEA DNA SPEC QL NAA+PROBE: NOT DETECTED
SOURCE (OHS): NORMAL
T VAGINALIS VAG QL WET PREP: ABNORMAL
WBC #/AREA VAG WET PREP: ABNORMAL
YEAST SPEC QL WET PREP: ABNORMAL

## 2025-03-05 PROCEDURE — 3008F BODY MASS INDEX DOCD: CPT | Mod: CPTII,,,

## 2025-03-05 PROCEDURE — 3074F SYST BP LT 130 MM HG: CPT | Mod: CPTII,,,

## 2025-03-05 PROCEDURE — 1159F MED LIST DOCD IN RCRD: CPT | Mod: CPTII,,,

## 2025-03-05 PROCEDURE — 99395 PREV VISIT EST AGE 18-39: CPT | Mod: S$PBB,,,

## 2025-03-05 PROCEDURE — 81025 URINE PREGNANCY TEST: CPT | Mod: PBBFAC

## 2025-03-05 PROCEDURE — 99214 OFFICE O/P EST MOD 30 MIN: CPT | Mod: PBBFAC

## 2025-03-05 PROCEDURE — 87210 SMEAR WET MOUNT SALINE/INK: CPT

## 2025-03-05 PROCEDURE — 99900041 HC LEFT WITHOUT BEING SEEN- EMERGENCY

## 2025-03-05 PROCEDURE — 87491 CHLMYD TRACH DNA AMP PROBE: CPT

## 2025-03-05 PROCEDURE — 3079F DIAST BP 80-89 MM HG: CPT | Mod: CPTII,,,

## 2025-03-05 RX ORDER — ACYCLOVIR 800 MG/1
800 TABLET ORAL 3 TIMES DAILY
Qty: 30 TABLET | Refills: 4 | Status: SHIPPED | OUTPATIENT
Start: 2025-03-05

## 2025-03-05 NOTE — PROGRESS NOTES
Swab shows rare clue cells indicating a mild case of BV. Not an STD but a bacterial infection in vaginal area usually related to a disruption in vaginal pH. Recommend hygiene changes to include use of unscented Dove soap, no scented soaps/ bath bombs, bubble baths. No douching. No feminine spray/powder and showers instead of baths. Instruct pt to wipe front to back and empty bladder before and after intercourse. No medication treatment at this time.

## 2025-03-05 NOTE — PROGRESS NOTES
[facility] -  Gynecology / Women's Health Clinic     Subjective:      Patient ID: Himanshu Mallory is a 29 y.o. female.    Chief Complaint:  Well Woman      History of Present Illness:  The patient is  (csectionx1 secondary to nuchal cord) here for annual gyn exam. Denies hx of abnormal pap. Last pap 2023-NIL. LMP 2025, monthly and manageable. Denies abnormal bleeding/discharge. Denies abd/pelvic pain. Denies breast complaints. Pt diagnosed with herpes simplex 2023 from PCR cx to vulva. States had 2 outbreaks within past year. She is not currently sexually active. She waxes for grooming. Pt is not on contraception and declines today.  States used depo provera in the past but stopped after 2 injections d/t prolonged bleeding. She also tried nuva ring but stopped as it was painful. Admits sexually active and desires STD testing. Denies fly hx of breast, ovarian, uterine or colon cancer. Works as Pharmacy Tech.    GYN & OB History:  Patient's last menstrual period was 2025.   Date of Last Pap: 2023    OB History    Para Term  AB Living   1 1 1   1   SAB IAB Ectopic Multiple Live Births       1      # Outcome Date GA Lbr Riccardo/2nd Weight Sex Type Anes PTL Lv   1 Term      CS-Unspec   HAO       Past Medical History:   Diagnosis Date    Asthma     Depression     HSV (herpes simplex virus) infection     Low TSH level 2023        Past Surgical History:   Procedure Laterality Date     SECTION      SKIN GRAFT          Social History     Tobacco Use    Smoking status: Never     Passive exposure: Never    Smokeless tobacco: Never   Substance and Sexual Activity    Alcohol use: Yes     Comment: occasionally    Drug use: Never    Sexual activity: Not Currently        Current Outpatient Medications   Medication Instructions    acyclovir (ZOVIRAX) 800 mg, Oral, 3 times daily, Take within 24-48 hours of outbreak    albuterol (PROVENTIL/VENTOLIN HFA) 90  "mcg/actuation inhaler 2 puffs, Every 4-6 hours PRN    cholecalciferol (vitamin D3) 50,000 Units, Oral, Every 7 days    diphenhydrAMINE (BENADRYL) 50 mg, Every 6 hours PRN    EPINEPHrine (EPIPEN) 0.3 mg/0.3 mL AtIn SMARTSI pre-filled pen syringe IM PRN    fluticasone propionate (FLONASE) 50 mcg, Each Nostril, Daily    loratadine (CLARITIN) 10 mg, Oral, Daily    olopatadine (PATANOL) 0.1 % ophthalmic solution SMARTSI Drop(s) In Eye(s) Twice Daily PRN       Review of patient's allergies indicates:  No Known Allergies      Review of Systems:  Review of Systems  Negative except for pertinent findings for positives per HPI.     Objective:     Physical Exam   Visit Vitals  /80 (BP Location: Right arm, Patient Position: Sitting)   Pulse 88   Temp 98.3 °F (36.8 °C) (Oral)   Resp 18   Ht 5' 4" (1.626 m)   Wt 86.3 kg (190 lb 3.2 oz)   LMP 2025   SpO2 100%   BMI 32.65 kg/m²       GENERAL: Well-developed female. No acute distress.    SKIN: Normal to inspection, warm and intact.  BREASTS: No rashes or erythema. No masses, lumps, discharge, tenderness.  VULVA: General appearance normal; external genitalia with no lesions or erythema.  VAGINA: Mucosa/vaginal vault pink, no abnormal discharge or lesions.  CERVIX: Pink, parous appearing os, no erythema or abnormal discharge.  BIMANUAL EXAM: reveals a 8 week-sized uterus. The uterus is non tender. Bilateral adnexa reveal no tenderness.  PSYCHIATRIC: Patient is oriented to person, place, and time. Mood and affect are normal.    Assessment:       ICD-10-CM ICD-9-CM   1. Well woman exam with routine gynecological exam  Z01.419 V72.31   2. Screening for STD (sexually transmitted disease)  Z11.3 V74.5   3. Hx of herpes simplex infection  Z86.19 V12.09       Plan:     1. Well woman exam with routine gynecological exam    2. Screening for STD (sexually transmitted disease)  -     Chlamydia/GC, PCR  -     Wet Prep, Genital  -     POCT urine pregnancy    3. Hx of herpes " simplex infection  -     acyclovir (ZOVIRAX) 800 MG Tab; Take 1 tablet (800 mg total) by mouth 3 (three) times daily. Take within 24-48 hours of outbreak  Dispense: 30 tablet; Refill: 4    Pelvic exam today  STD testing  UPT - neg    Hx of HSV, outbreak 2/24/25, denies more than 4 outbreaks in 1 yr, requesting refills for episodes.   Call with any GYN concerns    Follow up in about 1 year (around 3/5/2026) for Annual.

## 2025-03-05 NOTE — TELEPHONE ENCOUNTER
Tried calling to inform, lvm. Staff will try to reach patient again.      ----- Message from DEVIN Levy sent at 3/5/2025 12:21 PM CST -----  Swab shows rare clue cells indicating a mild case of BV. Not an STD but a bacterial infection in vaginal area usually related to a disruption in vaginal pH. Recommend hygiene changes to include use   of unscented Dove soap, no scented soaps/ bath bombs, bubble baths. No douching. No feminine spray/powder and showers instead of baths. Instruct pt to wipe front to back and empty bladder before and   after intercourse. No medication treatment at this time.     ----- Message -----  From: Kadi Weems MA  Sent: 3/5/2025   9:32 AM CST  To: DEVIN Levy

## 2025-03-06 ENCOUNTER — HOSPITAL ENCOUNTER (EMERGENCY)
Facility: HOSPITAL | Age: 30
Discharge: HOME | End: 2025-03-06
Attending: FAMILY MEDICINE
Payer: MEDICAID

## 2025-03-06 VITALS
HEART RATE: 89 BPM | TEMPERATURE: 98 F | BODY MASS INDEX: 26.98 KG/M2 | DIASTOLIC BLOOD PRESSURE: 88 MMHG | SYSTOLIC BLOOD PRESSURE: 124 MMHG | OXYGEN SATURATION: 98 % | HEIGHT: 64 IN | WEIGHT: 158 LBS | RESPIRATION RATE: 17 BRPM

## 2025-03-06 DIAGNOSIS — E86.0 DEHYDRATION: ICD-10-CM

## 2025-03-06 DIAGNOSIS — F12.188 CANNABIS HYPEREMESIS SYNDROME CONCURRENT WITH AND DUE TO CANNABIS ABUSE: ICD-10-CM

## 2025-03-06 DIAGNOSIS — R11.2 NAUSEA AND VOMITING, UNSPECIFIED VOMITING TYPE: Primary | ICD-10-CM

## 2025-03-06 LAB
ACCEPTIBLE SP GR UR QL: 1.02 (ref 1–1.03)
ALBUMIN SERPL-MCNC: 4.8 G/DL (ref 3.5–5)
ALBUMIN/GLOB SERPL: 1.1 RATIO (ref 1.1–2)
ALP SERPL-CCNC: 61 UNIT/L (ref 40–150)
ALT SERPL-CCNC: 16 UNIT/L (ref 0–55)
AMPHET UR QL SCN: NEGATIVE
ANION GAP SERPL CALC-SCNC: 12 MEQ/L
AST SERPL-CCNC: 18 UNIT/L (ref 5–34)
BACTERIA #/AREA URNS AUTO: ABNORMAL /HPF
BARBITURATE SCN PRESENT UR: NEGATIVE
BASOPHILS # BLD AUTO: 0.06 X10(3)/MCL
BASOPHILS NFR BLD AUTO: 0.7 %
BENZODIAZ UR QL SCN: NEGATIVE
BILIRUB SERPL-MCNC: 0.7 MG/DL
BILIRUB UR QL STRIP.AUTO: NEGATIVE
BUN SERPL-MCNC: 7.2 MG/DL (ref 7–18.7)
CALCIUM SERPL-MCNC: 9.9 MG/DL (ref 8.4–10.2)
CANNABINOIDS UR QL SCN: POSITIVE
CHLORIDE SERPL-SCNC: 105 MMOL/L (ref 98–107)
CLARITY UR: CLEAR
CO2 SERPL-SCNC: 22 MMOL/L (ref 22–29)
COCAINE UR QL SCN: NEGATIVE
COLOR UR AUTO: ABNORMAL
CREAT SERPL-MCNC: 0.77 MG/DL (ref 0.55–1.02)
CREAT/UREA NIT SERPL: 9
EOSINOPHIL # BLD AUTO: 0.06 X10(3)/MCL (ref 0–0.9)
EOSINOPHIL NFR BLD AUTO: 0.7 %
ERYTHROCYTE [DISTWIDTH] IN BLOOD BY AUTOMATED COUNT: 15 % (ref 11.5–17)
FENTANYL UR QL SCN: NEGATIVE
GFR SERPLBLD CREATININE-BSD FMLA CKD-EPI: >60 ML/MIN/1.73/M2
GLOBULIN SER-MCNC: 4.3 GM/DL (ref 2.4–3.5)
GLUCOSE SERPL-MCNC: 102 MG/DL (ref 74–100)
GLUCOSE UR QL STRIP: NORMAL
HCT VFR BLD AUTO: 36.4 % (ref 37–47)
HGB BLD-MCNC: 12.1 G/DL (ref 12–16)
HGB UR QL STRIP: NEGATIVE
HYALINE CASTS #/AREA URNS LPF: ABNORMAL /LPF
IMM GRANULOCYTES # BLD AUTO: 0.03 X10(3)/MCL (ref 0–0.04)
IMM GRANULOCYTES NFR BLD AUTO: 0.3 %
KETONES UR QL STRIP: ABNORMAL
LEUKOCYTE ESTERASE UR QL STRIP: NEGATIVE
LIPASE SERPL-CCNC: 14 U/L
LYMPHOCYTES # BLD AUTO: 1.53 X10(3)/MCL (ref 0.6–4.6)
LYMPHOCYTES NFR BLD AUTO: 17.2 %
MCH RBC QN AUTO: 24.8 PG (ref 27–31)
MCHC RBC AUTO-ENTMCNC: 33.2 G/DL (ref 33–36)
MCV RBC AUTO: 74.6 FL (ref 80–94)
MDMA UR QL SCN: NEGATIVE
MONOCYTES # BLD AUTO: 0.38 X10(3)/MCL (ref 0.1–1.3)
MONOCYTES NFR BLD AUTO: 4.3 %
MUCOUS THREADS URNS QL MICRO: ABNORMAL /LPF
NEUTROPHILS # BLD AUTO: 6.81 X10(3)/MCL (ref 2.1–9.2)
NEUTROPHILS NFR BLD AUTO: 76.8 %
NITRITE UR QL STRIP: NEGATIVE
NRBC BLD AUTO-RTO: 0 %
OPIATES UR QL SCN: NEGATIVE
PCP UR QL: NEGATIVE
PH UR STRIP: 8.5 [PH]
PH UR: 8.5 [PH] (ref 3–11)
PLATELET # BLD AUTO: 478 X10(3)/MCL (ref 130–400)
PMV BLD AUTO: 9.7 FL (ref 7.4–10.4)
POTASSIUM SERPL-SCNC: 3.7 MMOL/L (ref 3.5–5.1)
PROT SERPL-MCNC: 9.1 GM/DL (ref 6.4–8.3)
PROT UR QL STRIP: ABNORMAL
RBC # BLD AUTO: 4.88 X10(6)/MCL (ref 4.2–5.4)
RBC #/AREA URNS AUTO: ABNORMAL /HPF
SODIUM SERPL-SCNC: 139 MMOL/L (ref 136–145)
SP GR UR STRIP.AUTO: 1.02 (ref 1–1.03)
SQUAMOUS #/AREA URNS LPF: ABNORMAL /HPF
UROBILINOGEN UR STRIP-ACNC: NORMAL
WBC # BLD AUTO: 8.87 X10(3)/MCL (ref 4.5–11.5)
WBC #/AREA URNS AUTO: ABNORMAL /HPF

## 2025-03-06 PROCEDURE — 99284 EMERGENCY DEPT VISIT MOD MDM: CPT | Mod: 25

## 2025-03-06 PROCEDURE — 81001 URINALYSIS AUTO W/SCOPE: CPT

## 2025-03-06 PROCEDURE — 63600175 PHARM REV CODE 636 W HCPCS: Performed by: FAMILY MEDICINE

## 2025-03-06 PROCEDURE — 96372 THER/PROPH/DIAG INJ SC/IM: CPT | Performed by: FAMILY MEDICINE

## 2025-03-06 PROCEDURE — 96376 TX/PRO/DX INJ SAME DRUG ADON: CPT

## 2025-03-06 PROCEDURE — 63600175 PHARM REV CODE 636 W HCPCS: Mod: JZ,TB

## 2025-03-06 PROCEDURE — 25000003 PHARM REV CODE 250: Performed by: FAMILY MEDICINE

## 2025-03-06 PROCEDURE — 80307 DRUG TEST PRSMV CHEM ANLYZR: CPT

## 2025-03-06 PROCEDURE — 83690 ASSAY OF LIPASE: CPT

## 2025-03-06 PROCEDURE — 96374 THER/PROPH/DIAG INJ IV PUSH: CPT

## 2025-03-06 PROCEDURE — 85025 COMPLETE CBC W/AUTO DIFF WBC: CPT

## 2025-03-06 PROCEDURE — 96375 TX/PRO/DX INJ NEW DRUG ADDON: CPT

## 2025-03-06 PROCEDURE — 96361 HYDRATE IV INFUSION ADD-ON: CPT

## 2025-03-06 PROCEDURE — 80053 COMPREHEN METABOLIC PANEL: CPT

## 2025-03-06 RX ORDER — ONDANSETRON 4 MG/1
4 TABLET, ORALLY DISINTEGRATING ORAL EVERY 8 HOURS PRN
Qty: 20 TABLET | Refills: 0 | Status: SHIPPED | OUTPATIENT
Start: 2025-03-06

## 2025-03-06 RX ORDER — ONDANSETRON HYDROCHLORIDE 2 MG/ML
4 INJECTION, SOLUTION INTRAVENOUS
Status: COMPLETED | OUTPATIENT
Start: 2025-03-06 | End: 2025-03-06

## 2025-03-06 RX ORDER — KETOROLAC TROMETHAMINE 30 MG/ML
15 INJECTION, SOLUTION INTRAMUSCULAR; INTRAVENOUS
Status: COMPLETED | OUTPATIENT
Start: 2025-03-06 | End: 2025-03-06

## 2025-03-06 RX ORDER — DICYCLOMINE HYDROCHLORIDE 10 MG/ML
20 INJECTION INTRAMUSCULAR
Status: COMPLETED | OUTPATIENT
Start: 2025-03-06 | End: 2025-03-06

## 2025-03-06 RX ORDER — HYDROCODONE BITARTRATE AND ACETAMINOPHEN 5; 325 MG/1; MG/1
1 TABLET ORAL
Refills: 0 | Status: COMPLETED | OUTPATIENT
Start: 2025-03-06 | End: 2025-03-06

## 2025-03-06 RX ADMIN — DICYCLOMINE HYDROCHLORIDE 20 MG: 10 INJECTION, SOLUTION INTRAMUSCULAR at 02:03

## 2025-03-06 RX ADMIN — HYDROCODONE BITARTRATE AND ACETAMINOPHEN 1 TABLET: 5; 325 TABLET ORAL at 03:03

## 2025-03-06 RX ADMIN — SODIUM CHLORIDE, POTASSIUM CHLORIDE, SODIUM LACTATE AND CALCIUM CHLORIDE 1000 ML: 600; 310; 30; 20 INJECTION, SOLUTION INTRAVENOUS at 02:03

## 2025-03-06 RX ADMIN — KETOROLAC TROMETHAMINE 15 MG: 30 INJECTION, SOLUTION INTRAMUSCULAR; INTRAVENOUS at 12:03

## 2025-03-06 RX ADMIN — ONDANSETRON 4 MG: 2 INJECTION INTRAMUSCULAR; INTRAVENOUS at 02:03

## 2025-03-06 RX ADMIN — ONDANSETRON 4 MG: 2 INJECTION INTRAMUSCULAR; INTRAVENOUS at 12:03

## 2025-03-06 NOTE — ED PROVIDER NOTES
"Encounter Date: 3/5/2025       History     Chief Complaint   Patient presents with    Abdominal Pain    Vomiting     Abd pain and vomiting for a few hours think she has food poisoning. States she ate some shrimp and grits.      A 29 y.o. female patient with a history of asthma, depression, HSV, low TSH presents to the ED with abdominal pain, vomiting. The onset is "a few hours ago" after eating shrimp and grits she thinks was bad. Abdominal pain is midline. Denies fever, chills, cough, congestion, known sick contacts, SOB, CP. Patient is actively vomiting on exam. When asked about drug or alcohol consumption, patient endorses taking a few shots before this episode.       The history is provided by the patient.     Review of patient's allergies indicates:  No Known Allergies  Past Medical History:   Diagnosis Date    Asthma     Depression     HSV (herpes simplex virus) infection     Low TSH level 2023     Past Surgical History:   Procedure Laterality Date     SECTION      SKIN GRAFT       Family History   Problem Relation Name Age of Onset    No Known Problems Father      No Known Problems Mother       Social History[1]  Review of Systems   Constitutional:  Negative for chills and fever.   HENT:  Negative for congestion and sore throat.    Eyes:  Negative for visual disturbance.   Respiratory:  Negative for cough and shortness of breath.    Cardiovascular:  Negative for chest pain.   Gastrointestinal:  Positive for abdominal pain, nausea and vomiting. Negative for abdominal distention, constipation and diarrhea.   Genitourinary:  Negative for difficulty urinating and dysuria.   Musculoskeletal:  Negative for arthralgias.   Skin:  Negative for color change and rash.   Neurological:  Negative for weakness and headaches.   Hematological:  Does not bruise/bleed easily.   Psychiatric/Behavioral:  Negative for confusion.    All other systems reviewed and are negative.      Physical Exam     Initial Vitals " [03/06/25 0005]   BP Pulse Resp Temp SpO2   (!) 130/90 80 18 97.6 °F (36.4 °C) 99 %      MAP       --         Physical Exam    Nursing note and vitals reviewed.  Constitutional: She appears well-developed and well-nourished.   HENT:   Head: Normocephalic and atraumatic.   Right Ear: External ear normal.   Left Ear: External ear normal.   Nose: Nose normal.   Eyes: Conjunctivae and EOM are normal.   Neck: Neck supple.   Cardiovascular:  Normal rate, regular rhythm and normal heart sounds.     Exam reveals no gallop and no friction rub.       No murmur heard.  Pulmonary/Chest: Breath sounds normal. No respiratory distress. She has no wheezes. She has no rhonchi. She has no rales.   Abdominal: Abdomen is soft. Bowel sounds are normal. She exhibits no distension and no mass. There is abdominal tenderness in the epigastric area, periumbilical area and left upper quadrant. There is no rebound and no guarding.   Musculoskeletal:      Cervical back: Neck supple.     Neurological: She is alert and oriented to person, place, and time. GCS eye subscore is 4. GCS verbal subscore is 5. GCS motor subscore is 6.   Skin: Skin is warm and dry. Capillary refill takes less than 2 seconds.         ED Course   Procedures  Labs Reviewed   COMPREHENSIVE METABOLIC PANEL - Abnormal       Result Value    Sodium 139      Potassium 3.7      Chloride 105      CO2 22      Glucose 102 (*)     Blood Urea Nitrogen 7.2      Creatinine 0.77      Calcium 9.9      Protein Total 9.1 (*)     Albumin 4.8      Globulin 4.3 (*)     Albumin/Globulin Ratio 1.1      Bilirubin Total 0.7      ALP 61      ALT 16      AST 18      eGFR >60      Anion Gap 12.0      BUN/Creatinine Ratio 9     DRUG SCREEN, URINE (BEAKER) - Abnormal    Amphetamines, Urine Negative      Barbiturates, Urine Negative      Benzodiazepine, Urine Negative      Cannabinoids, Urine Positive (*)     Cocaine, Urine Negative      Fentanyl, Urine Negative      MDMA, Urine Negative      Opiates,  Urine Negative      Phencyclidine, Urine Negative      pH, Urine 8.5      Specific Gravity, Urine Auto 1.019      Narrative:     Cut off concentrations:    Amphetamines - 1000 ng/ml  Barbiturates - 200 ng/ml  Benzodiazepine - 200 ng/ml  Cannabinoids (THC) - 50 ng/ml  Cocaine - 300 ng/ml  Fentanyl - 1.0 ng/ml  MDMA - 500 ng/ml  Opiates - 300 ng/ml   Phencyclidine (PCP) - 25 ng/ml    Specimen submitted for drug analysis and tested for pH and specific gravity in order to evaluate sample integrity. Suspect tampering if specific gravity is <1.003 and/or pH is not within the range of 4.5 - 8.0  False negatives may result form substances such as bleach added to urine.  False positives may result for the presence of a substance with similar chemical structure to the drug or its metabolite.    This test provides only a PRELIMINARY analytical test result. A more specific alternate chemical method must be used in order to obtain a confirmed analytical result. Gas chromatography/mass spectrometry (GC/MS) is the preferred confirmatory method. Other chemical confirmation methods are available. Clinical consideration and professional judgement should be applied to any drug of abuse test result, particularly when preliminary positive results are used.    Positive results will be confirmed only at the physicians request. Unconfirmed screening results are to be used only for medical purposes (treatment).        URINALYSIS, REFLEX TO URINE CULTURE - Abnormal    Color, UA Light-Yellow      Appearance, UA Clear      Specific Gravity, UA 1.019      pH, UA 8.5      Protein, UA 1+ (*)     Glucose, UA Normal      Ketones, UA 2+ (*)     Blood, UA Negative      Bilirubin, UA Negative      Urobilinogen, UA Normal      Nitrites, UA Negative      Leukocyte Esterase, UA Negative      RBC, UA 0-5      WBC, UA 0-5      Bacteria, UA None Seen      Squamous Epithelial Cells, UA Few (*)     Mucous, UA Trace (*)     Hyaline Casts, UA None Seen     CBC  "WITH DIFFERENTIAL - Abnormal    WBC 8.87      RBC 4.88      Hgb 12.1      Hct 36.4 (*)     MCV 74.6 (*)     MCH 24.8 (*)     MCHC 33.2      RDW 15.0      Platelet 478 (*)     MPV 9.7      Neut % 76.8      Lymph % 17.2      Mono % 4.3      Eos % 0.7      Basophil % 0.7      Imm Grans % 0.3      Neut # 6.81      Lymph # 1.53      Mono # 0.38      Eos # 0.06      Baso # 0.06      Imm Gran # 0.03      NRBC% 0.0     LIPASE - Normal    Lipase Level 14     CBC W/ AUTO DIFFERENTIAL    Narrative:     The following orders were created for panel order CBC Auto Differential.  Procedure                               Abnormality         Status                     ---------                               -----------         ------                     CBC with Differential[[id number]]       Abnormal            Final result                 Please view results for these tests on the individual orders.          Imaging Results    None          Medications   ondansetron injection 4 mg (4 mg Intravenous Given 3/6/25 0052)   ketorolac injection 15 mg (15 mg Intravenous Given 3/6/25 0056)   dicyclomine injection 20 mg (20 mg Intramuscular Given 3/6/25 0219)   lactated ringers bolus 1,000 mL (0 mLs Intravenous Stopped 3/6/25 0310)   ondansetron injection 4 mg (4 mg Intravenous Given 3/6/25 0231)     Medical Decision Making  A 29 y.o. female patient with a history of asthma, depression, HSV, low TSH presents to the ED with abdominal pain, vomiting. The onset is "a few hours ago" after eating shrimp and grits she thinks was bad. Abdominal pain is midline. Denies fever, chills, cough, congestion, known sick contacts, SOB, CP. Patient is actively vomiting on exam. When asked about drug or alcohol consumption, patient endorses taking a few shots before this episode.       Amount and/or Complexity of Data Reviewed  Labs: ordered. Decision-making details documented in ED Course.    Risk  Prescription drug management.  Risk Details:  "             Additional MDM:   Differential Diagnosis:   Other: The following diagnoses were also considered and will be evaluated: GERD, PUD and Viral syndrome.            ED Course as of 03/06/25 0324   Thu Mar 06, 2025   0128 WBC: 8.87 [AG]   0128 Hemoglobin: 12.1 [AG]   0155 Bacteria, UA: None Seen [AG]   0322 Patient is signed over to me at change of shift by Heather WILSON and patient is a 29-year-old lady who was brought to the emergency room and stated she was apparently well until about 2 hours prior to presentation after eating shrimp and then she started having episodes of nausea and vomiting.  Patient here in the emergency room is initially given IV Toradol and IV Zofran which she tolerates and a cascade of labs were drawn.  Labs returned relatively within normal limits except for a CMP with glucose of 102, protein of 9.1, globulin elevated at 4.3, CBC is noted to be otherwise normal but MCV is noted to be low at 74.6, MCH is 24.8, and hematocrit is low at 36.4.  Urine drug screen returned positive for marijuana and urinalysis shows no signs of a UTI but shows 2+ ketones.  These findings are reviewed with the patient and she verbalizes her understanding.    Patient's clinical picture seems to be in keeping with a cannabis induced hyperemesis syndrome and this is further discussed with her and she expresses her understanding.  Patient is further hydrated with normal saline IV fluids and given IV Zofran and IM Bentyl which he tolerates.  Upon re-evaluation patient states he feels better and she is counseled on importance of drug abstinence.  Patient will be discharged home with Zofran ODT 4 mg p.o. t.i.d. p.r.n..  She is encouraged to follow up with the primary medical doctor in 2-3 days as needed and return to the ER if clinical picture worsens.  Patient is also advised to stay on a bland diet.  Patient verbalizes her understanding and her condition upon discharge is stable, vitals remained stable, she  is able to ambulate independently out of the emergency room. [OO]      ED Course User Index  [AG] Heather Patel PA  [OO] Day Cornell MD                       1. Differential diagnosis:  Acute gastroenteritis, food poisoning, pancreatitis  2. Comorbidities considered that were addressed or put patient at increased risk are reviewed and noted  3. External notes reviewed: As stated in MDM  4. Discussed case and management with patient  5. Independent interpretations of workup like labs  6. Diagnostic therapy is considered, ordered and those not considered. Scores considered  7. Social determinants of health considered (living situation and conditions, lives far, family siutation, psychiatric limitations, and possible substance abuse etc)    Clinical Impression:  Final diagnoses:  [R11.2] Nausea and vomiting, unspecified vomiting type (Primary)  [E86.0] Dehydration  [F12.188] Cannabis hyperemesis syndrome concurrent with and due to cannabis abuse       This chart is generated using a voice recognition system.  Grammatical and content areas may inadvertently be generated in error.  Please contact me if you find a perceive any inappropriate information in this chart.  Thank you.   ED Disposition Condition    Discharge Stable          ED Prescriptions       Medication Sig Dispense Start Date End Date Auth. Provider    ondansetron (ZOFRAN-ODT) 4 MG TbDL Take 1 tablet (4 mg total) by mouth every 8 (eight) hours as needed. 20 tablet 3/6/2025 -- Day Cornell MD          Follow-up Information       Follow up With Specialties Details Why Contact Info    Miriam Lazo, FNP Family Medicine Call in 2 days As needed [facility] [address]  [phone]                 [1]   Social History  Tobacco Use    Smoking status: Never     Passive exposure: Never    Smokeless tobacco: Never   Substance Use Topics    Alcohol use: Yes     Comment: occasionally    Drug use: Never        Aneta  MD Day  03/06/25 0323

## 2025-07-03 ENCOUNTER — TELEPHONE (OUTPATIENT)
Dept: FAMILY MEDICINE | Facility: CLINIC | Age: 30
End: 2025-07-03
Payer: MEDICAID

## 2025-07-03 NOTE — TELEPHONE ENCOUNTER
?? **Pre-Visit Call Screening**       ?? Reason for Visit  Chief complaint:  Vit D def, fatigue      ?? Medication Review  - - Patient reminded to bring medications to visit: Yes    - Any ER, urgent care, or hospital visits since last appointment: Yes    - If yes, date & location: 03/06/2025  [facility]      ?? Access and Support Needs  - Transportation issues: No  -  needed:  No

## 2025-07-18 ENCOUNTER — TELEPHONE (OUTPATIENT)
Dept: FAMILY MEDICINE | Facility: CLINIC | Age: 30
End: 2025-07-18
Payer: MEDICAID

## 2025-08-04 ENCOUNTER — TELEPHONE (OUTPATIENT)
Dept: FAMILY MEDICINE | Facility: CLINIC | Age: 30
End: 2025-08-04
Payer: MEDICAID